# Patient Record
Sex: MALE | Race: WHITE | NOT HISPANIC OR LATINO | Employment: OTHER | URBAN - METROPOLITAN AREA
[De-identification: names, ages, dates, MRNs, and addresses within clinical notes are randomized per-mention and may not be internally consistent; named-entity substitution may affect disease eponyms.]

---

## 2018-01-24 ENCOUNTER — HOSPITAL ENCOUNTER (EMERGENCY)
Facility: HOSPITAL | Age: 63
Discharge: HOME | End: 2018-01-24
Attending: EMERGENCY MEDICINE
Payer: MEDICARE

## 2018-01-24 VITALS
HEART RATE: 100 BPM | DIASTOLIC BLOOD PRESSURE: 67 MMHG | OXYGEN SATURATION: 99 % | HEIGHT: 68 IN | WEIGHT: 170 LBS | SYSTOLIC BLOOD PRESSURE: 137 MMHG | BODY MASS INDEX: 25.76 KG/M2 | TEMPERATURE: 99 F | RESPIRATION RATE: 20 BRPM

## 2018-01-24 DIAGNOSIS — M54.50 ACUTE LOW BACK PAIN WITHOUT SCIATICA, UNSPECIFIED BACK PAIN LATERALITY: ICD-10-CM

## 2018-01-24 DIAGNOSIS — S00.03XA CONTUSION OF SCALP, INITIAL ENCOUNTER: ICD-10-CM
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
## 2018-01-24 DIAGNOSIS — S80.01XA CONTUSION OF KNEE, RIGHT: ICD-10-CM

## 2018-01-24 DIAGNOSIS — E86.0 DEHYDRATION: ICD-10-CM

## 2018-01-24 DIAGNOSIS — R53.1 WEAKNESS: ICD-10-CM

## 2018-01-24 LAB
ALBUMIN SERPL BCP-MCNC: 3.7 G/DL
ALP SERPL-CCNC: 67 U/L
ALT SERPL W/O P-5'-P-CCNC: 50 U/L
ANION GAP SERPL CALC-SCNC: 12 MMOL/L
AST SERPL-CCNC: 47 U/L
BACTERIA #/AREA URNS AUTO: ABNORMAL /HPF
BASOPHILS # BLD AUTO: 0.01 K/UL
BASOPHILS NFR BLD: 0.1 %
BILIRUB SERPL-MCNC: 0.3 MG/DL
BILIRUB UR QL STRIP: NEGATIVE
BUN SERPL-MCNC: 20 MG/DL
CALCIUM SERPL-MCNC: 9.3 MG/DL
CHLORIDE SERPL-SCNC: 99 MMOL/L
CK MB SERPL-MCNC: 1 NG/ML
CK MB SERPL-RTO: 1.1 %
CK SERPL-CCNC: 87 U/L
CK SERPL-CCNC: 87 U/L
CLARITY UR REFRACT.AUTO: ABNORMAL
CO2 SERPL-SCNC: 26 MMOL/L
COLOR UR AUTO: YELLOW
CREAT SERPL-MCNC: 1.4 MG/DL
DIFFERENTIAL METHOD: ABNORMAL
EOSINOPHIL # BLD AUTO: 0.1 K/UL
EOSINOPHIL NFR BLD: 0.9 %
ERYTHROCYTE [DISTWIDTH] IN BLOOD BY AUTOMATED COUNT: 14.3 %
EST. GFR  (AFRICAN AMERICAN): >60 ML/MIN/1.73 M^2
EST. GFR  (NON AFRICAN AMERICAN): 52.4 ML/MIN/1.73 M^2
GLUCOSE SERPL-MCNC: 130 MG/DL
GLUCOSE UR QL STRIP: NEGATIVE
HCT VFR BLD AUTO: 39.6 %
HGB BLD-MCNC: 13.2 G/DL
HGB UR QL STRIP: ABNORMAL
HYALINE CASTS UR QL AUTO: 0 /LPF
KETONES UR QL STRIP: ABNORMAL
LEUKOCYTE ESTERASE UR QL STRIP: NEGATIVE
LYMPHOCYTES # BLD AUTO: 1.3 K/UL
LYMPHOCYTES NFR BLD: 16.1 %
MAGNESIUM SERPL-MCNC: 1.7 MG/DL
MCH RBC QN AUTO: 32.4 PG
MCHC RBC AUTO-ENTMCNC: 33.3 G/DL
MCV RBC AUTO: 97 FL
MICROSCOPIC COMMENT: ABNORMAL
MONOCYTES # BLD AUTO: 1 K/UL
MONOCYTES NFR BLD: 12.4 %
NEUTROPHILS # BLD AUTO: 5.5 K/UL
NEUTROPHILS NFR BLD: 70.4 %
NITRITE UR QL STRIP: NEGATIVE
PH UR STRIP: 6 [PH] (ref 5–8)
PHOSPHATE SERPL-MCNC: 3.9 MG/DL
PLATELET # BLD AUTO: 203 K/UL
PMV BLD AUTO: 10.9 FL
POCT GLUCOSE: 118 MG/DL (ref 70–110)
POTASSIUM SERPL-SCNC: 4.3 MMOL/L
PROT SERPL-MCNC: 7.6 G/DL
PROT UR QL STRIP: ABNORMAL
RBC # BLD AUTO: 4.08 M/UL
RBC #/AREA URNS AUTO: 0 /HPF (ref 0–4)
SODIUM SERPL-SCNC: 137 MMOL/L
SP GR UR STRIP: >=1.03 (ref 1–1.03)
TROPONIN I SERPL DL<=0.01 NG/ML-MCNC: 0.01 NG/ML
TSH SERPL DL<=0.005 MIU/L-ACNC: 2.24 UIU/ML
URN SPEC COLLECT METH UR: ABNORMAL
UROBILINOGEN UR STRIP-ACNC: NEGATIVE EU/DL
WBC # BLD AUTO: 7.82 K/UL
WBC #/AREA URNS AUTO: 3 /HPF (ref 0–5)

## 2018-01-24 PROCEDURE — 81000 URINALYSIS NONAUTO W/SCOPE: CPT

## 2018-01-24 PROCEDURE — 93005 ELECTROCARDIOGRAM TRACING: CPT

## 2018-01-24 PROCEDURE — 96360 HYDRATION IV INFUSION INIT: CPT

## 2018-01-24 PROCEDURE — 84443 ASSAY THYROID STIM HORMONE: CPT

## 2018-01-24 PROCEDURE — 82962 GLUCOSE BLOOD TEST: CPT

## 2018-01-24 PROCEDURE — 80053 COMPREHEN METABOLIC PANEL: CPT

## 2018-01-24 PROCEDURE — 83735 ASSAY OF MAGNESIUM: CPT

## 2018-01-24 PROCEDURE — 84100 ASSAY OF PHOSPHORUS: CPT

## 2018-01-24 PROCEDURE — 82550 ASSAY OF CK (CPK): CPT

## 2018-01-24 PROCEDURE — 99284 EMERGENCY DEPT VISIT MOD MDM: CPT | Mod: 25

## 2018-01-24 PROCEDURE — 82553 CREATINE MB FRACTION: CPT

## 2018-01-24 PROCEDURE — 96361 HYDRATE IV INFUSION ADD-ON: CPT

## 2018-01-24 PROCEDURE — 25000003 PHARM REV CODE 250: Performed by: EMERGENCY MEDICINE

## 2018-01-24 PROCEDURE — 84484 ASSAY OF TROPONIN QUANT: CPT

## 2018-01-24 PROCEDURE — 93010 ELECTROCARDIOGRAM REPORT: CPT | Mod: ,,, | Performed by: INTERNAL MEDICINE

## 2018-01-24 PROCEDURE — 85025 COMPLETE CBC W/AUTO DIFF WBC: CPT

## 2018-01-24 RX ORDER — VENLAFAXINE HYDROCHLORIDE 75 MG/1
75 CAPSULE, EXTENDED RELEASE ORAL DAILY
COMMUNITY

## 2018-01-24 RX ORDER — GLIMEPIRIDE 4 MG/1
4 TABLET ORAL
COMMUNITY

## 2018-01-24 RX ORDER — ATORVASTATIN CALCIUM 10 MG/1
10 TABLET, FILM COATED ORAL DAILY
COMMUNITY
End: 2023-01-21

## 2018-01-24 RX ORDER — LOSARTAN POTASSIUM 50 MG/1
50 TABLET ORAL DAILY
COMMUNITY

## 2018-01-24 RX ORDER — CLOBETASOL PROPIONATE 0.46 MG/ML
SOLUTION TOPICAL 2 TIMES DAILY
COMMUNITY

## 2018-01-24 RX ORDER — METFORMIN HYDROCHLORIDE 1000 MG/1
1000 TABLET ORAL 2 TIMES DAILY WITH MEALS
COMMUNITY

## 2018-01-24 RX ORDER — DILTIAZEM HYDROCHLORIDE 180 MG/1
180 CAPSULE, COATED, EXTENDED RELEASE ORAL 2 TIMES DAILY
COMMUNITY

## 2018-01-24 RX ORDER — OSELTAMIVIR PHOSPHATE 75 MG/1
75 CAPSULE ORAL DAILY
Status: ON HOLD | COMMUNITY
End: 2018-02-03 | Stop reason: HOSPADM

## 2018-01-24 RX ORDER — AMOXICILLIN 500 MG
1 CAPSULE ORAL DAILY
COMMUNITY

## 2018-01-24 RX ORDER — INSULIN GLARGINE 100 [IU]/ML
30 INJECTION, SOLUTION SUBCUTANEOUS DAILY
COMMUNITY
End: 2023-01-21

## 2018-01-24 RX ORDER — GABAPENTIN 300 MG/1
300 CAPSULE ORAL 2 TIMES DAILY
COMMUNITY

## 2018-01-24 RX ORDER — SOLIFENACIN SUCCINATE 5 MG/1
5 TABLET, FILM COATED ORAL DAILY
COMMUNITY

## 2018-01-24 RX ORDER — LEVOTHYROXINE SODIUM 50 UG/1
50 TABLET ORAL DAILY
Status: ON HOLD | COMMUNITY
End: 2018-02-03 | Stop reason: HOSPADM

## 2018-01-24 RX ORDER — ACETAMINOPHEN 325 MG/1
650 TABLET ORAL
Status: COMPLETED | OUTPATIENT
Start: 2018-01-24 | End: 2018-01-24

## 2018-01-24 RX ORDER — CALCIPOTRIENE AND BETAMETHASONE DIPROPIONATE 50; .5 UG/G; MG/G
SUSPENSION TOPICAL DAILY
COMMUNITY

## 2018-01-24 RX ORDER — PHENYLEPHRINE HCL 10 MG
1000 TABLET ORAL DAILY
COMMUNITY

## 2018-01-24 RX ADMIN — ACETAMINOPHEN 650 MG: 325 TABLET, FILM COATED ORAL at 03:01

## 2018-01-24 RX ADMIN — SODIUM CHLORIDE 1000 ML: 0.9 INJECTION, SOLUTION INTRAVENOUS at 03:01

## 2018-01-24 RX ADMIN — SODIUM CHLORIDE 1000 ML: 0.9 INJECTION, SOLUTION INTRAVENOUS at 04:01

## 2018-01-24 RX ADMIN — SODIUM CHLORIDE 1000 ML: 0.9 INJECTION, SOLUTION INTRAVENOUS at 02:01

## 2018-01-24 NOTE — ED NOTES
Pt c/o generalized weakness and fatigue, and his legs giving out on his this morning approx 6:30 this morning. Reports being diagnosed with flu yesterday, and started taking tamiflu last night. Fell this morning, now complaining of bilateral knee pain, and lower back pain. R leg is weaker than left with ambulation.     Level of Consciousness: Patient is awake, alert, oriented to person, place, time, and situation.    Appearance: Pt lying in stretcher, no acute distress at this time. Clothing appropriately placed and clean. Hygiene is appropriate.   Skin: Skin is warm, dry, and intact. Skin turgor is normal/elastic. Mucous membranes dry. Skin color is normal for ethnicity. Abrasion to R side of scalp, and R knee. No bleeding at this time.   Musculoskeletal: Moderate ROM impairment with raising R leg. Able to bend both legs. No deformities noted. Generalized fatigue. R leg weakness. Full ROM with dorsal and plantar plexion. Equal strengths. Reports bilateral knee pain and lower back pain.  Gait unsteady.  Respiratory: Airway open and patent. Respirations equal and unlabored. Breath sounds clear to auscultation. Denies any SOB.   Cardiac: Regular rate and rhythm. No peripheral edema noted. Radial and pedal pulses present and normal. Capillary refill is within normal limits. Denies chest pain.    GI: Abdomen soft, non-tender to all quadrants with palpitation. Bowel sounds present and active in all quads. Abdomen symmetric with no distention noted. Denies any N/V/D.    Neurological: Symmetrical expressions noted to face. Equal bilateral . Normal sensation reported to all extremities. No obvious neurological deficits noted.   Psychosocial: Speech spontaneous, clear, and coherent. Appropriate to situation. Family at bedside. Pt is calm and cooperative.     Pt informed of plan of care, verbalizes understanding, and denies any other questions, complaints, or concerns at this time. Bed in locked in lowest position,  siderails up x2, call light within reach. Pt placed on cardiac monitor, blood pressure cuff and continuous pulse ox in place. Will continue to monitor.

## 2018-01-24 NOTE — ED PROVIDER NOTES
Encounter Date: 1/24/2018       History     Chief Complaint   Patient presents with    Fall     Pt reports being diagnosed with flu yesterday, started tamiflu last night, and woke up approx 6:30 am and legs gave out on him. Reports falling multiple times. Denies LOC. Abrasion to R side of head. Reports R leg weakness continues.      HPI Pt was dx c Flu yesterday and began taking Tamiflu last night. He reports that when he woke up this am to change the thermostat he became weak and dizzy and fell with resulting pain to right knee and low back and a headache/scalp contusion. Pt reports that he has difficulty ambulating secondary to pain from left hip/low back to right knee. Symptoms are worse with movement and improve with rest. Pt reports pain is moderate to severe and constant.       Review of patient's allergies indicates:  No Known Allergies  Past Medical History:   Diagnosis Date    Depression     Diabetes mellitus     Hyperlipidemia     Hypertension     Neuropathy     Psoriasis     Thyroid disease      No past surgical history on file.  No family history on file.  Social History   Substance Use Topics    Smoking status: Not on file    Smokeless tobacco: Not on file    Alcohol use Not on file     Review of Systems   Constitutional: Positive for fatigue.   HENT: Positive for rhinorrhea and sore throat.    Respiratory: Positive for cough.    Musculoskeletal:        Right knee pain, Low Back Pain, Hip Pain   Neurological: Positive for dizziness.   All other systems reviewed and are negative.      Physical Exam     Initial Vitals [01/24/18 1250]   BP Pulse Resp Temp SpO2   (!) 147/70 90 18 98.6 °F (37 °C) 97 %      MAP       95.67         Physical Exam    Nursing note and vitals reviewed.  Constitutional: He appears well-developed and well-nourished. He appears distressed.   HENT:   Head: Normocephalic.   Mouth/Throat: Uvula is midline. Mucous membranes are dry. Posterior oropharyngeal erythema present. No  oropharyngeal exudate.   Small abrasion right scalp   Eyes: Conjunctivae and EOM are normal. Pupils are equal, round, and reactive to light.   Neck: Normal range of motion. Neck supple.   Cardiovascular: Normal rate and regular rhythm.   Pulmonary/Chest: Breath sounds normal. No respiratory distress. He has no wheezes.   Abdominal: Soft. Bowel sounds are normal.   Musculoskeletal: He exhibits tenderness.        Right hip: He exhibits decreased range of motion. He exhibits no tenderness and no crepitus.        Left hip: He exhibits decreased range of motion. He exhibits no tenderness and no crepitus.        Right knee: He exhibits decreased range of motion. He exhibits no swelling, no effusion, no ecchymosis and no deformity. Tenderness found.        Cervical back: Normal.        Thoracic back: Normal.        Lumbar back: He exhibits tenderness. He exhibits no swelling, no edema and no deformity.   Neurological: He is alert and oriented to person, place, and time. He has normal strength.   Skin: Skin is warm and dry.   Psychiatric: He has a normal mood and affect. Thought content normal.         ED Course   Procedures  Labs Reviewed   POCT GLUCOSE - Abnormal; Notable for the following:        Result Value    POCT Glucose 118 (*)     All other components within normal limits     Results for orders placed or performed during the hospital encounter of 01/24/18   CBC auto differential   Result Value Ref Range    WBC 7.82 3.90 - 12.70 K/uL    RBC 4.08 (L) 4.60 - 6.20 M/uL    Hemoglobin 13.2 (L) 14.0 - 18.0 g/dL    Hematocrit 39.6 (L) 40.0 - 54.0 %    MCV 97 82 - 98 fL    MCH 32.4 (H) 27.0 - 31.0 pg    MCHC 33.3 32.0 - 36.0 g/dL    RDW 14.3 11.5 - 14.5 %    Platelets 203 150 - 350 K/uL    MPV 10.9 9.2 - 12.9 fL    Gran # 5.5 1.8 - 7.7 K/uL    Lymph # 1.3 1.0 - 4.8 K/uL    Mono # 1.0 0.3 - 1.0 K/uL    Eos # 0.1 0.0 - 0.5 K/uL    Baso # 0.01 0.00 - 0.20 K/uL    Gran% 70.4 38.0 - 73.0 %    Lymph% 16.1 (L) 18.0 - 48.0 %     Mono% 12.4 4.0 - 15.0 %    Eosinophil% 0.9 0.0 - 8.0 %    Basophil% 0.1 0.0 - 1.9 %    Differential Method Automated    Comprehensive metabolic panel   Result Value Ref Range    Sodium 137 136 - 145 mmol/L    Potassium 4.3 3.5 - 5.1 mmol/L    Chloride 99 95 - 110 mmol/L    CO2 26 23 - 29 mmol/L    Glucose 130 (H) 70 - 110 mg/dL    BUN, Bld 20 8 - 23 mg/dL    Creatinine 1.4 0.5 - 1.4 mg/dL    Calcium 9.3 8.7 - 10.5 mg/dL    Total Protein 7.6 6.0 - 8.4 g/dL    Albumin 3.7 3.5 - 5.2 g/dL    Total Bilirubin 0.3 0.1 - 1.0 mg/dL    Alkaline Phosphatase 67 55 - 135 U/L    AST 47 (H) 10 - 40 U/L    ALT 50 (H) 10 - 44 U/L    Anion Gap 12 8 - 16 mmol/L    eGFR if African American >60.0 >60 mL/min/1.73 m^2    eGFR if non African American 52.4 (A) >60 mL/min/1.73 m^2   CK   Result Value Ref Range    CPK 87 20 - 200 U/L   CK-MB   Result Value Ref Range    CPK 87 20 - 200 U/L    CPK MB 1.0 0.1 - 6.5 ng/mL    MB% 1.1 0.0 - 5.0 %   Troponin I   Result Value Ref Range    Troponin I 0.009 0.000 - 0.026 ng/mL   Urinalysis   Result Value Ref Range    Specimen UA Urine, Clean Catch     Color, UA Yellow Yellow, Straw, [place]    Appearance, UA Hazy (A) Clear    pH, UA 6.0 5.0 - 8.0    Specific Gravity, UA >=1.030 (A) 1.005 - 1.030    Protein, UA 1+ (A) Negative    Glucose, UA Negative Negative    Ketones, UA 1+ (A) Negative    Bilirubin (UA) Negative Negative    Occult Blood UA Trace (A) Negative    Nitrite, UA Negative Negative    Urobilinogen, UA Negative <2.0 EU/dL    Leukocytes, UA Negative Negative   TSH   Result Value Ref Range    TSH 2.237 0.400 - 4.000 uIU/mL   Magnesium   Result Value Ref Range    Magnesium 1.7 1.6 - 2.6 mg/dL   Phosphorus   Result Value Ref Range    Phosphorus 3.9 2.7 - 4.5 mg/dL   Urinalysis Microscopic   Result Value Ref Range    RBC, UA 0 0 - 4 /hpf    WBC, UA 3 0 - 5 /hpf    Bacteria, UA Few (A) None-Occ /hpf    Hyaline Casts, UA 0 0-1/lpf /lpf    Microscopic Comment SEE COMMENT    POCT glucose   Result Value  Ref Range    POCT Glucose 118 (H) 70 - 110 mg/dL     Imaging Results          CT Pelvis Without Contrast (Final result)  Result time 01/24/18 14:51:33    Final result by OLIVER Grant Sr., MD (01/24/18 14:51:33)                 Impression:      1. No fracture or dislocation. The joint space of both hips is normal in appearance.   2. There is grade 1 retrolisthesis of L5 on S1.  3. There are mild degenerative changes between L5 and S1.   4. There are mild osteoarthritic changes in the sacroiliac joints bilaterally.        All CT scans at this facility use dose modulation, iterative reconstruction, and/or weight base dosing when appropriate to reduce radiation dose when appropriate to reduce radiation dose to as low as reasonably achievable.      Electronically signed by: OLIVER GRANT MD  Date:     01/24/18  Time:    14:51              Narrative:    CT of pelvis without IV Contrast    History: Hip pain status post fall    Technique: Standard pelvic CT protocol without IV contrast was performed.    Finding: Comparison was made to a CT examination of the lumbar spine performed on 1/24/2018. There is no fracture. There is no dislocation. The joint space of both hips is normal in appearance. There are mild degenerative changes between L5 and S1. There is grade 1 retrolisthesis of L5 on S1.    The appendix and the rest of the visualized portion of the gastrointestinal system are normal in appearance. There is no free fluid within the abdomen or pelvis. There is no pneumoperitoneum. There is a moderate amount of atherosclerosis. The prostate is absent. There are surgical clips in the pelvis. There are mild osteoarthritic changes in the sacroiliac joints bilaterally.                             CT Lumbar Spine Without Contrast (Final result)  Result time 01/24/18 14:41:24    Final result by Jose Pisano MD (01/24/18 14:41:24)                 Impression:       Degenerative changes greatest at L5/S1    All CT scans at  this facility use dose modulation, iterative reconstruction, and/or weight base dosing when appropriate to reduce radiation dose to as low as reasonably achievable.      Electronically signed by: JOSE TEJADA MD  Date:     01/24/18  Time:    14:41              Narrative:    Lumbar spine CT without contrast:    History:  Low back pain    Results:  The lumbar vertebral bodies demonstrate adequate alignment.The vertebral body heights are well-maintained.No fractures are identified.No secondary evidence of fracture (such as jacob-osseous hematoma) is identified.Mild osteopenia.    There is mild degenerative disc disease with the greatest level of involvement being L5/S1 with posterior disc osteophyte complex and disc space narrowing producing mild canal stenosis and mild bilateral neuroforaminal narrowing. Small posterior bulge is seen at L4/5.    Moderate to severe atherosclerotic disease within the aorta and iliac vessels. Nonspecific perinephric stranding. The remaining visualized paravertebral and intraabdominal structures demonstrate no pathology.                             CT Head Without Contrast (Final result)  Result time 01/24/18 14:32:01    Final result by oJse Tejada MD (01/24/18 14:32:01)                 Impression:       No acute abnormality identified.    All CT scans at this facility use dose modulation, iterative reconstruction and/or weight based dosing when appropriate to reduce radiation dose to as low as reasonably achievable.      Electronically signed by: JOSE ETJADA MD  Date:     01/24/18  Time:    14:32              Narrative:    Indication: Trauma or a.    Axial CT images of the head were obtained without  contrast.    Comparison:  None    Findings: Remote left-sided lacunar infarct.    Ventricles, sulci, and cisterns are symmetric without evidence of mass effect.  Brain volume and white matter are normal for age.  No intra or extraaxial masses, hemorrhages, abnormal fluid collections or  abnormal calcifications. The cranium and extracranial structures are unremarkable.  Mild ethmoid mucosal thickening. Visualized sinuses and mastoid air cells are clear.                             X-Ray Chest 1 View (Final result)  Result time 01/24/18 14:07:57    Final result by Jose Tejada MD (01/24/18 14:07:57)                 Impression:       No acute process seen. Weakness      Electronically signed by: JOSE TEJADA MD  Date:     01/24/18  Time:    14:07              Narrative:    Clinical Data:Weakness    Comparison:  none    Findings:  Single view of the chest.      Cardiac silhouette is normal.  The lungs demonstrate no evidence of active disease.  No evidence of pleural effusion or pneumothorax.  Bones appear intact.                             X-Ray Knee Complete 4 Or More Views Right (Final result)  Result time 01/24/18 14:07:39    Final result by Jose Tejada MD (01/24/18 14:07:39)                 Impression:      No acute fracture or dislocation.        Electronically signed by: JOSE TEJADA MD  Date:     01/24/18  Time:    14:07              Narrative:    History:  Pain    Comparison:  None    Results:  4 views of the right knee were obtained.    No evidence of acute fracture or dislocation.  Bony mineralization is normal.  Soft tissues are unremarkable.  Mild tricompartment degenerative changes. No significant joint effusion. Vascular calcifications are noted.                              EKG Readings: (Independently Interpreted)   Initial Reading: No STEMI. Rhythm: Normal Sinus Rhythm. Heart Rate: 93. Ectopy: No Ectopy. ST Segments: Normal ST Segments. T Waves: Normal. Axis: Normal. Clinical Impression: Normal Sinus Rhythm     4:51 PM - Counseling: Spoke with the patient and discussed todays findings, in addition to providing specific details for the plan of care and counseling regarding the diagnosis and prognosis. Questions are answered at this time. GCS 15 Pt feels improved after IVFs,  N/V intact. Patient presents with upper respiratory and flulike symptoms. Based on my assessment in the ED, I do not suspect any respiratory, airway, pulmonary, cardiovascular (including myocarditis), metabolic, CNS, medical, or surgical emergency medical condition. I have discussed with the patient and/or caregiver signs and symptoms for secondary bacterial infections, such as pneumonia. I believe that the patient's symptoms are most consistent with a viral illness, possibly influenza. Patient is safe for discharge home with conservative therapy. Trauma precautions were discussed with patient and/or family/caretaker; I do not specifically detect any abdominal, thoracic, CNS, orthopedic, or other emergent or life threatening condition and that patient is safe to be discharged.  It was also discussed that despite an unrevealing examination and negative radiographic examination for serious or life threatening injury, these conditions may still exist.  As such, patient should return to ED immediately should they experience, severe or worsening pain, shortness of breath, abdominal pain, headache, vomiting, or any other concern.  It was also discussed that not infrequently, injuries may not be diagnosed during the initial ED visit (such as fractures) and that if the patient discovers a new area of concern, a new area of injury that was not evaluated in the ED, they should return for evaluation as they may have an injury that requires treatment.                             ED Course      Clinical Impression:   The primary encounter diagnosis was Fall. Diagnoses of Contusion of knee, right, Weakness, Dehydration, Acute low back pain without sciatica, unspecified back pain laterality, and Contusion of scalp, initial encounter were also pertinent to this visit.    Disposition:   Disposition: Discharged  Condition: Stable                        Marcelino Swann MD  01/24/18 [id number]

## 2018-02-01 ENCOUNTER — HOSPITAL ENCOUNTER (INPATIENT)
Facility: HOSPITAL | Age: 63
LOS: 2 days | Discharge: HOME | DRG: 871 | End: 2018-02-03
Attending: EMERGENCY MEDICINE | Admitting: HOSPITALIST
Payer: MEDICARE

## 2018-02-01 DIAGNOSIS — E87.20 LACTIC ACIDOSIS: ICD-10-CM

## 2018-02-01 DIAGNOSIS — R65.20 SEVERE SEPSIS: Primary | ICD-10-CM

## 2018-02-01 DIAGNOSIS — A41.9 SEVERE SEPSIS: Primary | ICD-10-CM

## 2018-02-01 DIAGNOSIS — N30.01 ACUTE CYSTITIS WITH HEMATURIA: ICD-10-CM

## 2018-02-01 DIAGNOSIS — R53.1 WEAKNESS: ICD-10-CM

## 2018-02-01 LAB
ALBUMIN SERPL BCP-MCNC: 3.4 G/DL
ALP SERPL-CCNC: 77 U/L
ALT SERPL W/O P-5'-P-CCNC: 21 U/L
ANION GAP SERPL CALC-SCNC: 16 MMOL/L
APTT BLDCRRT: 28.4 SEC
AST SERPL-CCNC: 14 U/L
BACTERIA #/AREA URNS AUTO: ABNORMAL /HPF
BASOPHILS # BLD AUTO: 0.02 K/UL
BASOPHILS NFR BLD: 0.2 %
BILIRUB SERPL-MCNC: 0.5 MG/DL
BILIRUB UR QL STRIP: ABNORMAL
BNP SERPL-MCNC: <10 PG/ML
BUN SERPL-MCNC: 30 MG/DL
CALCIUM SERPL-MCNC: 9.7 MG/DL
CHLORIDE SERPL-SCNC: 94 MMOL/L
CLARITY UR REFRACT.AUTO: ABNORMAL
CO2 SERPL-SCNC: 24 MMOL/L
COLOR UR AUTO: ABNORMAL
CREAT SERPL-MCNC: 2.2 MG/DL
DIFFERENTIAL METHOD: ABNORMAL
EOSINOPHIL # BLD AUTO: 0.1 K/UL
EOSINOPHIL NFR BLD: 0.9 %
ERYTHROCYTE [DISTWIDTH] IN BLOOD BY AUTOMATED COUNT: 13.5 %
EST. GFR  (AFRICAN AMERICAN): 35 ML/MIN/1.73 M^2
EST. GFR  (NON AFRICAN AMERICAN): 30.3 ML/MIN/1.73 M^2
GLUCOSE SERPL-MCNC: 284 MG/DL
GLUCOSE UR QL STRIP: NEGATIVE
HCT VFR BLD AUTO: 39.1 %
HGB BLD-MCNC: 13 G/DL
HGB UR QL STRIP: NEGATIVE
HYALINE CASTS UR QL AUTO: 2 /LPF
INR PPP: 1
KETONES UR QL STRIP: ABNORMAL
LACTATE SERPL-SCNC: 1.7 MMOL/L
LACTATE SERPL-SCNC: 2.7 MMOL/L
LEUKOCYTE ESTERASE UR QL STRIP: ABNORMAL
LIPASE SERPL-CCNC: 31 U/L
LYMPHOCYTES # BLD AUTO: 2.2 K/UL
LYMPHOCYTES NFR BLD: 18.5 %
MAGNESIUM SERPL-MCNC: 1.9 MG/DL
MCH RBC QN AUTO: 31.8 PG
MCHC RBC AUTO-ENTMCNC: 33.2 G/DL
MCV RBC AUTO: 96 FL
MICROSCOPIC COMMENT: ABNORMAL
MONOCYTES # BLD AUTO: 1.3 K/UL
MONOCYTES NFR BLD: 10.7 %
NEUTROPHILS # BLD AUTO: 8.4 K/UL
NEUTROPHILS NFR BLD: 69.2 %
NITRITE UR QL STRIP: NEGATIVE
PH UR STRIP: 6 [PH] (ref 5–8)
PHOSPHATE SERPL-MCNC: 3.8 MG/DL
PLATELET # BLD AUTO: 342 K/UL
PMV BLD AUTO: 10.8 FL
POTASSIUM SERPL-SCNC: 4.1 MMOL/L
PROT SERPL-MCNC: 8 G/DL
PROT UR QL STRIP: ABNORMAL
PROTHROMBIN TIME: 10.7 SEC
RBC # BLD AUTO: 4.09 M/UL
RBC #/AREA URNS AUTO: 1 /HPF (ref 0–4)
SODIUM SERPL-SCNC: 134 MMOL/L
SP GR UR STRIP: >=1.03 (ref 1–1.03)
SQUAMOUS #/AREA URNS AUTO: 10 /HPF
T4 FREE SERPL-MCNC: 0.99 NG/DL
TROPONIN I SERPL DL<=0.01 NG/ML-MCNC: <0.006 NG/ML
TSH SERPL DL<=0.005 MIU/L-ACNC: 4.19 UIU/ML
URN SPEC COLLECT METH UR: ABNORMAL
UROBILINOGEN UR STRIP-ACNC: NEGATIVE EU/DL
WBC # BLD AUTO: 12.09 K/UL
WBC #/AREA URNS AUTO: 53 /HPF (ref 0–5)

## 2018-02-01 PROCEDURE — 63600175 PHARM REV CODE 636 W HCPCS: Performed by: EMERGENCY MEDICINE

## 2018-02-01 PROCEDURE — 83690 ASSAY OF LIPASE: CPT

## 2018-02-01 PROCEDURE — 84484 ASSAY OF TROPONIN QUANT: CPT

## 2018-02-01 PROCEDURE — 84100 ASSAY OF PHOSPHORUS: CPT

## 2018-02-01 PROCEDURE — 84443 ASSAY THYROID STIM HORMONE: CPT

## 2018-02-01 PROCEDURE — 87077 CULTURE AEROBIC IDENTIFY: CPT

## 2018-02-01 PROCEDURE — 93010 ELECTROCARDIOGRAM REPORT: CPT | Mod: ,,, | Performed by: NUCLEAR MEDICINE

## 2018-02-01 PROCEDURE — 21400001 HC TELEMETRY ROOM

## 2018-02-01 PROCEDURE — 83605 ASSAY OF LACTIC ACID: CPT

## 2018-02-01 PROCEDURE — 87186 SC STD MICRODIL/AGAR DIL: CPT

## 2018-02-01 PROCEDURE — 80053 COMPREHEN METABOLIC PANEL: CPT

## 2018-02-01 PROCEDURE — 85730 THROMBOPLASTIN TIME PARTIAL: CPT

## 2018-02-01 PROCEDURE — 99285 EMERGENCY DEPT VISIT HI MDM: CPT | Mod: 25

## 2018-02-01 PROCEDURE — 96361 HYDRATE IV INFUSION ADD-ON: CPT

## 2018-02-01 PROCEDURE — 85610 PROTHROMBIN TIME: CPT

## 2018-02-01 PROCEDURE — 84439 ASSAY OF FREE THYROXINE: CPT

## 2018-02-01 PROCEDURE — 83880 ASSAY OF NATRIURETIC PEPTIDE: CPT

## 2018-02-01 PROCEDURE — 87040 BLOOD CULTURE FOR BACTERIA: CPT | Mod: 59

## 2018-02-01 PROCEDURE — 85025 COMPLETE CBC W/AUTO DIFF WBC: CPT

## 2018-02-01 PROCEDURE — 93005 ELECTROCARDIOGRAM TRACING: CPT

## 2018-02-01 PROCEDURE — 83735 ASSAY OF MAGNESIUM: CPT

## 2018-02-01 PROCEDURE — 25000003 PHARM REV CODE 250: Performed by: EMERGENCY MEDICINE

## 2018-02-01 PROCEDURE — 99900035 HC TECH TIME PER 15 MIN (STAT)

## 2018-02-01 PROCEDURE — 81000 URINALYSIS NONAUTO W/SCOPE: CPT

## 2018-02-01 PROCEDURE — 96374 THER/PROPH/DIAG INJ IV PUSH: CPT

## 2018-02-01 PROCEDURE — 87086 URINE CULTURE/COLONY COUNT: CPT

## 2018-02-01 PROCEDURE — 87088 URINE BACTERIA CULTURE: CPT

## 2018-02-01 RX ORDER — SODIUM CHLORIDE 9 MG/ML
INJECTION, SOLUTION INTRAVENOUS CONTINUOUS
Status: DISCONTINUED | OUTPATIENT
Start: 2018-02-01 | End: 2018-02-03

## 2018-02-01 RX ORDER — ACETAMINOPHEN 325 MG/1
650 TABLET ORAL EVERY 8 HOURS PRN
Status: DISCONTINUED | OUTPATIENT
Start: 2018-02-01 | End: 2018-02-02

## 2018-02-01 RX ORDER — CEFTRIAXONE 1 G/50ML
1 INJECTION, SOLUTION INTRAVENOUS
Status: DISCONTINUED | OUTPATIENT
Start: 2018-02-01 | End: 2018-02-01

## 2018-02-01 RX ORDER — ONDANSETRON 2 MG/ML
4 INJECTION INTRAMUSCULAR; INTRAVENOUS EVERY 12 HOURS PRN
Status: DISCONTINUED | OUTPATIENT
Start: 2018-02-01 | End: 2018-02-02

## 2018-02-01 RX ORDER — CEFTRIAXONE 1 G/1
1 INJECTION, POWDER, FOR SOLUTION INTRAMUSCULAR; INTRAVENOUS
Status: DISCONTINUED | OUTPATIENT
Start: 2018-02-01 | End: 2018-02-01

## 2018-02-01 RX ADMIN — SODIUM CHLORIDE 2244 ML: 0.9 INJECTION, SOLUTION INTRAVENOUS at 07:02

## 2018-02-01 RX ADMIN — CEFTRIAXONE SODIUM 1 G: 1 INJECTION, POWDER, FOR SOLUTION INTRAMUSCULAR; INTRAVENOUS at 08:02

## 2018-02-01 RX ADMIN — SODIUM CHLORIDE: 0.9 INJECTION, SOLUTION INTRAVENOUS at 09:02

## 2018-02-02 PROBLEM — N17.9 AKI (ACUTE KIDNEY INJURY): Status: ACTIVE | Noted: 2018-02-02

## 2018-02-02 PROBLEM — N30.01 ACUTE CYSTITIS WITH HEMATURIA: Status: ACTIVE | Noted: 2018-02-02

## 2018-02-02 PROBLEM — R65.20 SEVERE SEPSIS: Status: ACTIVE | Noted: 2018-02-01

## 2018-02-02 PROBLEM — A41.9 SEVERE SEPSIS: Status: ACTIVE | Noted: 2018-02-01

## 2018-02-02 LAB
ALBUMIN SERPL BCP-MCNC: 3 G/DL
ALP SERPL-CCNC: 69 U/L
ALT SERPL W/O P-5'-P-CCNC: 17 U/L
ANION GAP SERPL CALC-SCNC: 9 MMOL/L
AST SERPL-CCNC: 13 U/L
BASOPHILS # BLD AUTO: 0.02 K/UL
BASOPHILS NFR BLD: 0.2 %
BILIRUB SERPL-MCNC: 0.2 MG/DL
BUN SERPL-MCNC: 24 MG/DL
CALCIUM SERPL-MCNC: 9 MG/DL
CHLORIDE SERPL-SCNC: 103 MMOL/L
CO2 SERPL-SCNC: 29 MMOL/L
CREAT SERPL-MCNC: 1.4 MG/DL
DIFFERENTIAL METHOD: ABNORMAL
EOSINOPHIL # BLD AUTO: 0.2 K/UL
EOSINOPHIL NFR BLD: 2.1 %
ERYTHROCYTE [DISTWIDTH] IN BLOOD BY AUTOMATED COUNT: 13.6 %
EST. GFR  (AFRICAN AMERICAN): >60 ML/MIN/1.73 M^2
EST. GFR  (NON AFRICAN AMERICAN): 52 ML/MIN/1.73 M^2
ESTIMATED AVG GLUCOSE: 160 MG/DL
GLUCOSE SERPL-MCNC: 262 MG/DL
HBA1C MFR BLD HPLC: 7.2 %
HCT VFR BLD AUTO: 32.6 %
HGB BLD-MCNC: 11.8 G/DL
LACTATE SERPL-SCNC: 1.7 MMOL/L
LYMPHOCYTES # BLD AUTO: 2.1 K/UL
LYMPHOCYTES NFR BLD: 25.2 %
MAGNESIUM SERPL-MCNC: 1.8 MG/DL
MCH RBC QN AUTO: 32.3 PG
MCHC RBC AUTO-ENTMCNC: 36.2 G/DL
MCV RBC AUTO: 89 FL
MONOCYTES # BLD AUTO: 0.9 K/UL
MONOCYTES NFR BLD: 10.6 %
NEUTROPHILS # BLD AUTO: 5.1 K/UL
NEUTROPHILS NFR BLD: 61.9 %
PHOSPHATE SERPL-MCNC: 2.3 MG/DL
PLATELET # BLD AUTO: 294 K/UL
PMV BLD AUTO: 9.9 FL
POCT GLUCOSE: 224 MG/DL (ref 70–110)
POCT GLUCOSE: 233 MG/DL (ref 70–110)
POCT GLUCOSE: 244 MG/DL (ref 70–110)
POCT GLUCOSE: 249 MG/DL (ref 70–110)
POTASSIUM SERPL-SCNC: 4.6 MMOL/L
PROT SERPL-MCNC: 7 G/DL
RBC # BLD AUTO: 3.65 M/UL
SODIUM SERPL-SCNC: 141 MMOL/L
WBC # BLD AUTO: 8.17 K/UL

## 2018-02-02 PROCEDURE — 36415 COLL VENOUS BLD VENIPUNCTURE: CPT

## 2018-02-02 PROCEDURE — 83605 ASSAY OF LACTIC ACID: CPT

## 2018-02-02 PROCEDURE — 80053 COMPREHEN METABOLIC PANEL: CPT

## 2018-02-02 PROCEDURE — 85025 COMPLETE CBC W/AUTO DIFF WBC: CPT

## 2018-02-02 PROCEDURE — 25000003 PHARM REV CODE 250: Performed by: NURSE PRACTITIONER

## 2018-02-02 PROCEDURE — 83036 HEMOGLOBIN GLYCOSYLATED A1C: CPT

## 2018-02-02 PROCEDURE — 25000003 PHARM REV CODE 250: Performed by: EMERGENCY MEDICINE

## 2018-02-02 PROCEDURE — 96372 THER/PROPH/DIAG INJ SC/IM: CPT

## 2018-02-02 PROCEDURE — 25000003 PHARM REV CODE 250: Performed by: HOSPITALIST

## 2018-02-02 PROCEDURE — 63600175 PHARM REV CODE 636 W HCPCS: Performed by: NURSE PRACTITIONER

## 2018-02-02 PROCEDURE — 63600175 PHARM REV CODE 636 W HCPCS: Performed by: HOSPITALIST

## 2018-02-02 PROCEDURE — 83735 ASSAY OF MAGNESIUM: CPT

## 2018-02-02 PROCEDURE — 63600175 PHARM REV CODE 636 W HCPCS: Performed by: FAMILY MEDICINE

## 2018-02-02 PROCEDURE — 84100 ASSAY OF PHOSPHORUS: CPT

## 2018-02-02 PROCEDURE — 21400001 HC TELEMETRY ROOM

## 2018-02-02 RX ORDER — LOSARTAN POTASSIUM 50 MG/1
50 TABLET ORAL DAILY
Status: DISCONTINUED | OUTPATIENT
Start: 2018-02-02 | End: 2018-02-03 | Stop reason: HOSPADM

## 2018-02-02 RX ORDER — ENOXAPARIN SODIUM 100 MG/ML
40 INJECTION SUBCUTANEOUS EVERY 24 HOURS
Status: DISCONTINUED | OUTPATIENT
Start: 2018-02-02 | End: 2018-02-03 | Stop reason: HOSPADM

## 2018-02-02 RX ORDER — DILTIAZEM HYDROCHLORIDE 180 MG/1
180 CAPSULE, COATED, EXTENDED RELEASE ORAL DAILY
Status: DISCONTINUED | OUTPATIENT
Start: 2018-02-02 | End: 2018-02-03 | Stop reason: HOSPADM

## 2018-02-02 RX ORDER — GABAPENTIN 300 MG/1
300 CAPSULE ORAL 2 TIMES DAILY
Status: DISCONTINUED | OUTPATIENT
Start: 2018-02-02 | End: 2018-02-03 | Stop reason: HOSPADM

## 2018-02-02 RX ORDER — PROMETHAZINE HYDROCHLORIDE AND CODEINE PHOSPHATE 6.25; 1 MG/5ML; MG/5ML
5 SOLUTION ORAL EVERY 6 HOURS PRN
Status: DISCONTINUED | OUTPATIENT
Start: 2018-02-02 | End: 2018-02-03 | Stop reason: HOSPADM

## 2018-02-02 RX ORDER — INSULIN ASPART 100 [IU]/ML
0-5 INJECTION, SOLUTION INTRAVENOUS; SUBCUTANEOUS
Status: DISCONTINUED | OUTPATIENT
Start: 2018-02-02 | End: 2018-02-03 | Stop reason: HOSPADM

## 2018-02-02 RX ORDER — LEVOTHYROXINE SODIUM 50 UG/1
50 TABLET ORAL
Status: DISCONTINUED | OUTPATIENT
Start: 2018-02-02 | End: 2018-02-03

## 2018-02-02 RX ORDER — ATORVASTATIN CALCIUM 10 MG/1
10 TABLET, FILM COATED ORAL DAILY
Status: DISCONTINUED | OUTPATIENT
Start: 2018-02-02 | End: 2018-02-03 | Stop reason: HOSPADM

## 2018-02-02 RX ORDER — ASPIRIN 81 MG/1
81 TABLET ORAL DAILY
Status: DISCONTINUED | OUTPATIENT
Start: 2018-02-02 | End: 2018-02-03 | Stop reason: HOSPADM

## 2018-02-02 RX ORDER — PANTOPRAZOLE SODIUM 40 MG/1
40 TABLET, DELAYED RELEASE ORAL DAILY
Status: DISCONTINUED | OUTPATIENT
Start: 2018-02-02 | End: 2018-02-03 | Stop reason: HOSPADM

## 2018-02-02 RX ORDER — GLUCAGON 1 MG
1 KIT INJECTION
Status: DISCONTINUED | OUTPATIENT
Start: 2018-02-02 | End: 2018-02-03 | Stop reason: HOSPADM

## 2018-02-02 RX ORDER — VENLAFAXINE HYDROCHLORIDE 75 MG/1
75 CAPSULE, EXTENDED RELEASE ORAL DAILY
Status: DISCONTINUED | OUTPATIENT
Start: 2018-02-02 | End: 2018-02-03 | Stop reason: HOSPADM

## 2018-02-02 RX ORDER — IBUPROFEN 200 MG
24 TABLET ORAL
Status: DISCONTINUED | OUTPATIENT
Start: 2018-02-02 | End: 2018-02-03 | Stop reason: HOSPADM

## 2018-02-02 RX ORDER — ENOXAPARIN SODIUM 100 MG/ML
30 INJECTION SUBCUTANEOUS EVERY 24 HOURS
Status: DISCONTINUED | OUTPATIENT
Start: 2018-02-02 | End: 2018-02-02

## 2018-02-02 RX ORDER — IBUPROFEN 200 MG
16 TABLET ORAL
Status: DISCONTINUED | OUTPATIENT
Start: 2018-02-02 | End: 2018-02-03 | Stop reason: HOSPADM

## 2018-02-02 RX ORDER — ONDANSETRON 2 MG/ML
4 INJECTION INTRAMUSCULAR; INTRAVENOUS EVERY 6 HOURS PRN
Status: DISCONTINUED | OUTPATIENT
Start: 2018-02-02 | End: 2018-02-03 | Stop reason: HOSPADM

## 2018-02-02 RX ORDER — ACETAMINOPHEN 325 MG/1
650 TABLET ORAL EVERY 8 HOURS PRN
Status: DISCONTINUED | OUTPATIENT
Start: 2018-02-02 | End: 2018-02-03 | Stop reason: HOSPADM

## 2018-02-02 RX ORDER — HYDRALAZINE HYDROCHLORIDE 20 MG/ML
10 INJECTION INTRAMUSCULAR; INTRAVENOUS EVERY 6 HOURS PRN
Status: DISCONTINUED | OUTPATIENT
Start: 2018-02-02 | End: 2018-02-03 | Stop reason: HOSPADM

## 2018-02-02 RX ORDER — PROMETHAZINE HYDROCHLORIDE AND DEXTROMETHORPHAN HYDROBROMIDE 6.25; 15 MG/5ML; MG/5ML
5 SYRUP ORAL EVERY 6 HOURS PRN
COMMUNITY
Start: 2018-01-23

## 2018-02-02 RX ORDER — SOLIFENACIN SUCCINATE 5 MG/1
5 TABLET, FILM COATED ORAL DAILY
Status: DISCONTINUED | OUTPATIENT
Start: 2018-02-02 | End: 2018-02-03 | Stop reason: HOSPADM

## 2018-02-02 RX ORDER — ASPIRIN 81 MG/1
81 TABLET ORAL DAILY
COMMUNITY
End: 2023-01-21

## 2018-02-02 RX ADMIN — INSULIN DETEMIR 30 UNITS: 100 INJECTION, SOLUTION SUBCUTANEOUS at 09:02

## 2018-02-02 RX ADMIN — INSULIN ASPART 2 UNITS: 100 INJECTION, SOLUTION INTRAVENOUS; SUBCUTANEOUS at 05:02

## 2018-02-02 RX ADMIN — VENLAFAXINE HYDROCHLORIDE 75 MG: 75 CAPSULE, EXTENDED RELEASE ORAL at 09:02

## 2018-02-02 RX ADMIN — GUAIFENESIN AND DEXTROMETHORPHAN HYDROBROMIDE 1 TABLET: 600; 30 TABLET, EXTENDED RELEASE ORAL at 09:02

## 2018-02-02 RX ADMIN — INSULIN ASPART 1 UNITS: 100 INJECTION, SOLUTION INTRAVENOUS; SUBCUTANEOUS at 09:02

## 2018-02-02 RX ADMIN — PROMETHAZINE HYDROCHLORIDE AND CODEINE PHOSPHATE 5 ML: 10; 6.25 SOLUTION ORAL at 10:02

## 2018-02-02 RX ADMIN — ONDANSETRON 4 MG: 2 INJECTION INTRAMUSCULAR; INTRAVENOUS at 10:02

## 2018-02-02 RX ADMIN — ASPIRIN 81 MG: 81 TABLET, COATED ORAL at 04:02

## 2018-02-02 RX ADMIN — LOSARTAN POTASSIUM 50 MG: 50 TABLET, FILM COATED ORAL at 04:02

## 2018-02-02 RX ADMIN — PANTOPRAZOLE SODIUM 40 MG: 40 TABLET, DELAYED RELEASE ORAL at 09:02

## 2018-02-02 RX ADMIN — SODIUM CHLORIDE: 0.9 INJECTION, SOLUTION INTRAVENOUS at 04:02

## 2018-02-02 RX ADMIN — ATORVASTATIN CALCIUM 10 MG: 10 TABLET, FILM COATED ORAL at 09:02

## 2018-02-02 RX ADMIN — CEFTRIAXONE 1 G: 1 INJECTION, SOLUTION INTRAVENOUS at 07:02

## 2018-02-02 RX ADMIN — INSULIN ASPART 2 UNITS: 100 INJECTION, SOLUTION INTRAVENOUS; SUBCUTANEOUS at 06:02

## 2018-02-02 RX ADMIN — LEVOTHYROXINE SODIUM 50 MCG: 50 TABLET ORAL at 05:02

## 2018-02-02 RX ADMIN — PROMETHAZINE HYDROCHLORIDE AND CODEINE PHOSPHATE 5 ML: 10; 6.25 SOLUTION ORAL at 01:02

## 2018-02-02 RX ADMIN — GABAPENTIN 300 MG: 300 CAPSULE ORAL at 09:02

## 2018-02-02 RX ADMIN — SOLIFENACIN SUCCINATE 5 MG: 5 TABLET, FILM COATED ORAL at 09:02

## 2018-02-02 RX ADMIN — ENOXAPARIN SODIUM 40 MG: 100 INJECTION SUBCUTANEOUS at 04:02

## 2018-02-02 RX ADMIN — DILTIAZEM HYDROCHLORIDE 180 MG: 180 CAPSULE, COATED, EXTENDED RELEASE ORAL at 03:02

## 2018-02-02 RX ADMIN — INSULIN ASPART 2 UNITS: 100 INJECTION, SOLUTION INTRAVENOUS; SUBCUTANEOUS at 12:02

## 2018-02-02 RX ADMIN — ACETAMINOPHEN 650 MG: 325 TABLET ORAL at 04:02

## 2018-02-02 NOTE — HOSPITAL COURSE
Mr. Ordaz is a 62yo male with a PMHx of HTN, hypothyroidism, and DM II, admitted for severe sepsis from UTI. Sepsis protocol including IV hydration initiated in the ED. Most likely secondary to UTI. Patient being treated with IV hydration and IV Rocephin. Patient c/o left knee pain and swelling. Left knee xray showed Questionable avulsion fracture medial femoral condyle in the region of the medial collateral ligament insertion. Knee immobilizer placed. Blood cx show NGTD. Urine cx growing a small GNR. Pt feels much better now, no new fever chills, no urinary Sx, no fever or chills, eating drinking well, walking around in the [place] well. Blood Cx remain NGTD. He wishes to be discharged home now. He was seen and examined today and deemed stable for discharge.

## 2018-02-02 NOTE — ASSESSMENT & PLAN NOTE
- Likely due to IVVD.  - IV hydration.  - Strict I&O's.  - Hold home ARB + metformin.  - Improved

## 2018-02-02 NOTE — ASSESSMENT & PLAN NOTE
- Likely secondary to UTI.  - BP in ED 78/51.  Received IVF bolus in ED with good BP response.  - Remains hemodynamically stable, no shock.  - Lactic 2.7, cr. 2.2.  - Afebrile, WBC normal.  - Blood and urine cultures pending.  - IV fluid resuscitation initiated in ED.  - Continuous IVF's.  - Empiric abx with IV Rocephin.  - Lactic normal   -Blood cx show NGTD  -Urine cx pending

## 2018-02-02 NOTE — ASSESSMENT & PLAN NOTE
- Likely due to IVVD.  - IV hydration.  - Strict I&O's.  - Hold home ARB + metformin.  - Repeat BMP in AM.

## 2018-02-02 NOTE — ASSESSMENT & PLAN NOTE
- Hypotensive in ED.  Hemodynamically stable currently.  -  Monitor BP trends and resume as needed.  -BP meds resumed

## 2018-02-02 NOTE — PROGRESS NOTES
[facility] Hospital Medicine  Progress Note    Patient Name: Sailaja Ordaz  MRN: [id number]  Patient Class: IP- Inpatient   Admission Date: 2/1/2018  Length of Stay: 1 days  Attending Physician: Ezekiel Blanca MD  Primary Care Provider: Clark Lu MD        Subjective:     Principal Problem:Severe sepsis    HPI:  Ms. Ordaz is a 62yo female with a PMHx of HTN, hypothyroidism, and DM II, who presented to the ED with c/o fatigue x 1 week.  Associated nausea and decreased appetite/PO intake.  Denies any CP, palpitations, SOB, orthopnea, PND, cough, edema, ABD pain, vomiting, diarrhea, dysuria, hematuria, lightheadedness/dizziness, syncope, AMS, focal deficits, or fever.  She reports being diagnosed with influenza 1 week ago, and has declined each day since.  Upon arrival to ED, patient notably hypotensive with BP 78/51, .  Patient received IVF bolus with good BP response.  Initial work-up in ED resulted BUN 30, cr. 2.2, glucose 284, lactic 2.7.  UA consistent with UTI.  CXR unremarkable.  Hospital Medicine was consulted for admission.  Currently, patient appears comfortable in NAD.  Patient remains hemodynamically stable.    Hospital Course:  63 year old female admitted for severe sepsis. Sepsis protocol including IV hydration initiated in the ED. Most likely secondary to UTI. Patient being treated with IV hydration and IV Rocephin. Patient c/o left knee pain and swelling. Left knee xray showed Questionable avulsion fracture medial femoral condyle in the region of the medial collateral ligament insertion. Knee immobilizer placed. Blood cx show NGTD. Urine cx pending.     Interval History: No acute events overnight. Patient c/o left knee pain and swelling, xray revealed Questionable avulsion fracture medial femoral condyle. Knee immobilizer place. Blood cx show NGTD. Urine cx pending. Will continue current medical management.     Review of Systems   Constitutional: Positive for appetite  change and fatigue. Negative for activity change, chills, diaphoresis, fever and unexpected weight change.   HENT: Negative for congestion, postnasal drip, rhinorrhea, sinus pressure, sore throat and trouble swallowing.    Eyes: Negative for photophobia and visual disturbance.   Respiratory: Negative for apnea, cough, chest tightness, shortness of breath and wheezing.    Cardiovascular: Negative for chest pain, palpitations and leg swelling.   Gastrointestinal: Negative for abdominal distention, abdominal pain, blood in stool, constipation, diarrhea, nausea and vomiting.   Endocrine: Negative for polydipsia, polyphagia and polyuria.   Genitourinary: Negative for decreased urine volume, difficulty urinating, dysuria, flank pain, frequency, hematuria and urgency.   Musculoskeletal: Negative for arthralgias, back pain, gait problem, joint swelling and myalgias.        +left knee pain and swelling    Skin: Negative for pallor, rash and wound.   Neurological: Negative for dizziness, seizures, syncope, facial asymmetry, speech difficulty, weakness, light-headedness, numbness and headaches.   Psychiatric/Behavioral: Negative for confusion. The patient is not nervous/anxious.    All other systems reviewed and are negative.    Objective:     Vital Signs (Most Recent):  Temp: 98 °F (36.7 °C) (02/02/18 1222)  Pulse: 93 (02/02/18 1222)  Resp: 18 (02/02/18 1222)  BP: (!) 160/82 (02/02/18 1222)  SpO2: 96 % (02/02/18 1222) Vital Signs (24h Range):  Temp:  [98 °F (36.7 °C)-99.1 °F (37.3 °C)] 98 °F (36.7 °C)  Pulse:  [] 93  Resp:  [14-22] 18  SpO2:  [94 %-100 %] 96 %  BP: ()/(51-85) 160/82     Weight: 76 kg (167 lb 8.8 oz)  Body mass index is 25.48 kg/m².    Intake/Output Summary (Last 24 hours) at 02/02/18 1406  Last data filed at 02/02/18 1030   Gross per 24 hour   Intake             3834 ml   Output              950 ml   Net             2884 ml      Physical Exam   Constitutional: He is oriented to person, place, and  time. He appears well-developed and well-nourished. No distress.   HENT:   Head: Normocephalic and atraumatic.   Eyes: Conjunctivae are normal.   Neck: Normal range of motion. Neck supple. No JVD present.   Cardiovascular: Normal rate, regular rhythm, S1 normal, S2 normal and intact distal pulses.   No extrasystoles are present. Exam reveals no gallop.    No murmur heard.  Pulses:       Radial pulses are 2+ on the right side, and 2+ on the left side.        Dorsalis pedis pulses are 2+ on the right side, and 2+ on the left side.        Posterior tibial pulses are 2+ on the right side, and 2+ on the left side.   Pulmonary/Chest: Effort normal and breath sounds normal. No accessory muscle usage. No tachypnea. No respiratory distress. He has no decreased breath sounds. He has no wheezes. He has no rhonchi. He has no rales.   Abdominal: Soft. Bowel sounds are normal. He exhibits no distension. There is no tenderness. There is no rebound, no guarding and no CVA tenderness.   Musculoskeletal: Normal range of motion. He exhibits no edema, tenderness or deformity.        Left knee: He exhibits swelling. He exhibits no deformity and no erythema.   LLE: Neurovascularly intact   Neurological: He is alert and oriented to person, place, and time. No cranial nerve deficit or sensory deficit.   Skin: Skin is warm, dry and intact. Capillary refill takes less than 2 seconds. No rash noted. He is not diaphoretic. No cyanosis or erythema. No pallor.   Psychiatric: He has a normal mood and affect. His speech is normal and behavior is normal. Cognition and memory are normal.   Nursing note and vitals reviewed.      Significant Labs:   Blood Culture:   Recent Labs  Lab 02/01/18 1915   LABBLOO No Growth to date  No Growth to date     BMP:   Recent Labs  Lab 02/02/18 0439   *      K 4.6      CO2 29   BUN 24*   CREATININE 1.4   CALCIUM 9.0   MG 1.8     CBC:   Recent Labs  Lab 02/01/18 1916 02/02/18  0439   WBC 12.09  8.17   HGB 13.0* 11.8*   HCT 39.1* 32.6*    294     CMP:   Recent Labs  Lab 02/01/18 1916 02/02/18  0439   * 141   K 4.1 4.6   CL 94* 103   CO2 24 29   * 262*   BUN 30* 24*   CREATININE 2.2* 1.4   CALCIUM 9.7 9.0   PROT 8.0 7.0   ALBUMIN 3.4* 3.0*   BILITOT 0.5 0.2   ALKPHOS 77 69   AST 14 13   ALT 21 17   ANIONGAP 16 9   EGFRNONAA 30.3* 52*     Lactic Acid:   Recent Labs  Lab 02/01/18 1916 02/01/18  2235 02/02/18  0118   LACTATE 2.7* 1.7 1.7     Urine Culture: No results for input(s): LABURIN in the last 48 hours.  Urine Studies:   Recent Labs  Lab 02/01/18  1900   COLORU Julieta   APPEARANCEUA Hazy*   PHUR 6.0   SPECGRAV >=1.030*   PROTEINUA 2+*   GLUCUA Negative   KETONESU 1+*   BILIRUBINUA 1+*   OCCULTUA Negative   NITRITE Negative   UROBILINOGEN Negative   LEUKOCYTESUR Trace*   RBCUA 1   WBCUA 53*   BACTERIA Many*   SQUAMEPITHEL 10   HYALINECASTS 2*     All pertinent labs within the past 24 hours have been reviewed.    Significant Imaging:  Imaging Results          X-Ray Knee 1 or 2 View Left (Final result)  Result time 02/02/18 13:49:22    Final result by Mary Watkins Jr., MD (02/02/18 13:49:22)                 Impression:      Questionable avulsion fracture medial femoral condyle in the region of the medial collateral ligament insertion.  Correlate with pain at the site.       Electronically signed by: MARY WATKINS  Date:     02/02/18  Time:    13:49              Narrative:    Exam: 2 views left knee.    Clinical Indication: Pain and swelling    Findings: Triangular shaped bony density adjacent to the medial femoral condyle may represent an avulsion fracture.  Correlate with pain at this site.  No evidence of a joint effusion.  Narrowing of the patellofemoral joint space.  Vascular calcifications throughout the leg.                             X-Ray Chest AP Portable (Final result)  Result time 02/01/18 19:29:01    Final result by Raymond Crawley MD (02/01/18 19:29:01)                  Impression:         Negative single view chest x-ray.      Electronically signed by: MAN PALACIO MD  Date:     02/01/18  Time:    19:29              Narrative:    Procedure: XR CHEST AP PORTABLE, 02/01/18 19:19:56    Clinical indication:  Cough and fever.  Possible sepsis.    Comparison: None    Findings: Heart size is normal. The lung fields are clear. No acute cardiopulmonary infiltrate.                            Assessment/Plan:      * Severe sepsis    - Likely secondary to UTI.  - BP in ED 78/51.  Received IVF bolus in ED with good BP response.  - Remains hemodynamically stable, no shock.  - Lactic 2.7, cr. 2.2.  - Afebrile, WBC normal.  - Blood and urine cultures pending.  - IV fluid resuscitation initiated in ED.  - Continuous IVF's.  - Empiric abx with IV Rocephin.  - Lactic normal   -Blood cx show NGTD  -Urine cx pending            Type 2 diabetes mellitus, uncontrolled    - Accuchecks with SSI.  - Continue home Levemir 30 units daily.  - Check HbA1c pending         Essential hypertension    - Hypotensive in ED.  Hemodynamically stable currently.  -  Monitor BP trends and resume as needed.  -BP meds resumed         JOSIAH (acute kidney injury)    - Likely due to IVVD.  - IV hydration.  - Strict I&O's.  - Hold home ARB + metformin.  - Improved         Acute cystitis with hematuria    - Urine culture pending.  - IV Rocephin           VTE Risk Mitigation         Ordered     enoxaparin injection 40 mg  Daily     Route:  Subcutaneous        02/02/18 0818     Medium Risk of VTE  Once      02/01/18 2334     Place sequential compression device  Until discontinued      02/01/18 2337              Kimi Mcaario NP  Department of Hospital Medicine   [facility]

## 2018-02-02 NOTE — SUBJECTIVE & OBJECTIVE
Interval History: No acute events overnight. Patient c/o left knee pain and swelling, xray revealed Questionable avulsion fracture medial femoral condyle. Knee immobilizer place. Blood cx show NGTD. Urine cx pending. Will continue current medical management.     Review of Systems   Constitutional: Positive for appetite change and fatigue. Negative for activity change, chills, diaphoresis, fever and unexpected weight change.   HENT: Negative for congestion, postnasal drip, rhinorrhea, sinus pressure, sore throat and trouble swallowing.    Eyes: Negative for photophobia and visual disturbance.   Respiratory: Negative for apnea, cough, chest tightness, shortness of breath and wheezing.    Cardiovascular: Negative for chest pain, palpitations and leg swelling.   Gastrointestinal: Negative for abdominal distention, abdominal pain, blood in stool, constipation, diarrhea, nausea and vomiting.   Endocrine: Negative for polydipsia, polyphagia and polyuria.   Genitourinary: Negative for decreased urine volume, difficulty urinating, dysuria, flank pain, frequency, hematuria and urgency.   Musculoskeletal: Negative for arthralgias, back pain, gait problem, joint swelling and myalgias.        +left knee pain and swelling    Skin: Negative for pallor, rash and wound.   Neurological: Negative for dizziness, seizures, syncope, facial asymmetry, speech difficulty, weakness, light-headedness, numbness and headaches.   Psychiatric/Behavioral: Negative for confusion. The patient is not nervous/anxious.    All other systems reviewed and are negative.    Objective:     Vital Signs (Most Recent):  Temp: 98 °F (36.7 °C) (02/02/18 1222)  Pulse: 93 (02/02/18 1222)  Resp: 18 (02/02/18 1222)  BP: (!) 160/82 (02/02/18 1222)  SpO2: 96 % (02/02/18 1222) Vital Signs (24h Range):  Temp:  [98 °F (36.7 °C)-99.1 °F (37.3 °C)] 98 °F (36.7 °C)  Pulse:  [] 93  Resp:  [14-22] 18  SpO2:  [94 %-100 %] 96 %  BP: ()/(51-85) 160/82     Weight: 76  kg (167 lb 8.8 oz)  Body mass index is 25.48 kg/m².    Intake/Output Summary (Last 24 hours) at 02/02/18 1406  Last data filed at 02/02/18 1030   Gross per 24 hour   Intake             3834 ml   Output              950 ml   Net             2884 ml      Physical Exam   Constitutional: He is oriented to person, place, and time. He appears well-developed and well-nourished. No distress.   HENT:   Head: Normocephalic and atraumatic.   Eyes: Conjunctivae are normal.   Neck: Normal range of motion. Neck supple. No JVD present.   Cardiovascular: Normal rate, regular rhythm, S1 normal, S2 normal and intact distal pulses.   No extrasystoles are present. Exam reveals no gallop.    No murmur heard.  Pulses:       Radial pulses are 2+ on the right side, and 2+ on the left side.        Dorsalis pedis pulses are 2+ on the right side, and 2+ on the left side.        Posterior tibial pulses are 2+ on the right side, and 2+ on the left side.   Pulmonary/Chest: Effort normal and breath sounds normal. No accessory muscle usage. No tachypnea. No respiratory distress. He has no decreased breath sounds. He has no wheezes. He has no rhonchi. He has no rales.   Abdominal: Soft. Bowel sounds are normal. He exhibits no distension. There is no tenderness. There is no rebound, no guarding and no CVA tenderness.   Musculoskeletal: Normal range of motion. He exhibits no edema, tenderness or deformity.        Left knee: He exhibits swelling. He exhibits no deformity and no erythema.   LLE: Neurovascularly intact   Neurological: He is alert and oriented to person, place, and time. No cranial nerve deficit or sensory deficit.   Skin: Skin is warm, dry and intact. Capillary refill takes less than 2 seconds. No rash noted. He is not diaphoretic. No cyanosis or erythema. No pallor.   Psychiatric: He has a normal mood and affect. His speech is normal and behavior is normal. Cognition and memory are normal.   Nursing note and vitals  reviewed.      Significant Labs:   Blood Culture:   Recent Labs  Lab 02/01/18 1915   LABBLOO No Growth to date  No Growth to date     BMP:   Recent Labs  Lab 02/02/18  0439   *      K 4.6      CO2 29   BUN 24*   CREATININE 1.4   CALCIUM 9.0   MG 1.8     CBC:   Recent Labs  Lab 02/01/18 1916 02/02/18  0439   WBC 12.09 8.17   HGB 13.0* 11.8*   HCT 39.1* 32.6*    294     CMP:   Recent Labs  Lab 02/01/18 1916 02/02/18  0439   * 141   K 4.1 4.6   CL 94* 103   CO2 24 29   * 262*   BUN 30* 24*   CREATININE 2.2* 1.4   CALCIUM 9.7 9.0   PROT 8.0 7.0   ALBUMIN 3.4* 3.0*   BILITOT 0.5 0.2   ALKPHOS 77 69   AST 14 13   ALT 21 17   ANIONGAP 16 9   EGFRNONAA 30.3* 52*     Lactic Acid:   Recent Labs  Lab 02/01/18 1916 02/01/18 2235 02/02/18  0118   LACTATE 2.7* 1.7 1.7     Urine Culture: No results for input(s): LABURIN in the last 48 hours.  Urine Studies:   Recent Labs  Lab 02/01/18 1900   COLORU Julieta   APPEARANCEUA Hazy*   PHUR 6.0   SPECGRAV >=1.030*   PROTEINUA 2+*   GLUCUA Negative   KETONESU 1+*   BILIRUBINUA 1+*   OCCULTUA Negative   NITRITE Negative   UROBILINOGEN Negative   LEUKOCYTESUR Trace*   RBCUA 1   WBCUA 53*   BACTERIA Many*   SQUAMEPITHEL 10   HYALINECASTS 2*     All pertinent labs within the past 24 hours have been reviewed.    Significant Imaging:  Imaging Results          X-Ray Knee 1 or 2 View Left (Final result)  Result time 02/02/18 13:49:22    Final result by Mary Watkins Jr., MD (02/02/18 13:49:22)                 Impression:      Questionable avulsion fracture medial femoral condyle in the region of the medial collateral ligament insertion.  Correlate with pain at the site.       Electronically signed by: MARY WATKINS  Date:     02/02/18  Time:    13:49              Narrative:    Exam: 2 views left knee.    Clinical Indication: Pain and swelling    Findings: Triangular shaped bony density adjacent to the medial femoral condyle may represent an  avulsion fracture.  Correlate with pain at this site.  No evidence of a joint effusion.  Narrowing of the patellofemoral joint space.  Vascular calcifications throughout the leg.                             X-Ray Chest AP Portable (Final result)  Result time 02/01/18 19:29:01    Final result by Man Palacio MD (02/01/18 19:29:01)                 Impression:         Negative single view chest x-ray.      Electronically signed by: MAN PALACIO MD  Date:     02/01/18  Time:    19:29              Narrative:    Procedure: XR CHEST AP PORTABLE, 02/01/18 19:19:56    Clinical indication:  Cough and fever.  Possible sepsis.    Comparison: None    Findings: Heart size is normal. The lung fields are clear. No acute cardiopulmonary infiltrate.

## 2018-02-02 NOTE — H&P
[facility] Hospital Medicine  History & Physical    Patient Name: Sailaja Ordaz  MRN: [id number]  Admission Date: 2/1/2018  Attending Physician: Gideon James MD  Primary Care Provider: Clark Lu MD         Patient information was obtained from patient, past medical records and ER records.     Subjective:     Principal Problem:Severe sepsis    Chief Complaint:   Chief Complaint   Patient presents with    Fatigue     diagnosed with the flu 8 days ago and feels very weak and dehydrated.         HPI: Ms. Ordaz is a 62yo male with a PMHx of HTN, hypothyroidism, and DM II, who presented to the ED with c/o fatigue x 1 week.  Associated nausea and decreased appetite/PO intake.  Denies any CP, palpitations, SOB, orthopnea, PND, cough, edema, ABD pain, vomiting, diarrhea, dysuria, hematuria, lightheadedness/dizziness, syncope, AMS, focal deficits, or fever.  He reports being diagnosed with influenza 1 week ago, and has declined each day since.  Upon arrival to ED, patient notably hypotensive with BP 78/51, .  Patient received IVF bolus with good BP response.  Initial work-up in ED resulted BUN 30, cr. 2.2, glucose 284, lactic 2.7.  UA consistent with UTI.  CXR unremarkable.  Hospital Medicine was consulted for admission.  Currently, patient appears comfortable in NAD.  Patient remains hemodynamically stable.      Past Medical History:   Diagnosis Date    Cancer     Depression     Diabetes mellitus     Hyperlipidemia     Hypertension     Neuropathy     Psoriasis     Thyroid disease        Past Surgical History:   Procedure Laterality Date    PROSTATECTOMY         Review of patient's allergies indicates:  No Known Allergies    No current facility-administered medications on file prior to encounter.      Current Outpatient Prescriptions on File Prior to Encounter   Medication Sig    atorvastatin (LIPITOR) 10 MG tablet Take 10 mg by mouth once daily.    calcipotriene-betamethasone  (TACLONEX) external suspension Apply topically once daily.    cinnamon bark (CINNAMON) 500 mg capsule Take 1,000 mg by mouth once daily.    clobetasol (TEMOVATE) 0.05 % external solution Apply topically 2 (two) times daily.    diltiaZEM (CARDIZEM CD) 180 MG 24 hr capsule Take 180 mg by mouth 2 (two) times daily.    etanercept (ENBREL) 25 mg/0.5mL (0.51) Syrg injection Inject 25 mg into the skin once a week.    fish oil-omega-3 fatty acids 300-1,000 mg capsule Take 1 capsule by mouth once daily.    FOLIC ACID/MULTIVIT-MIN/LUTEIN (CENTRUM SILVER ORAL) Take by mouth Daily.    gabapentin (NEURONTIN) 300 MG capsule Take 300 mg by mouth 2 (two) times daily.    glimepiride (AMARYL) 4 MG tablet Take 4 mg by mouth before breakfast.    insulin glargine (LANTUS) 100 unit/mL injection Inject 30 Units into the skin once daily.    levothyroxine (SYNTHROID) 50 MCG tablet Take 50 mcg by mouth once daily.    losartan (COZAAR) 50 MG tablet Take 50 mg by mouth once daily.    metFORMIN (GLUCOPHAGE) 1000 MG tablet Take 1,000 mg by mouth 2 (two) times daily with meals.    oseltamivir (TAMIFLU) 75 MG capsule Take 75 mg by mouth once daily.    solifenacin (VESICARE) 5 MG tablet Take 5 mg by mouth once daily.    venlafaxine (EFFEXOR-XR) 75 MG 24 hr capsule Take 75 mg by mouth once daily.     Family History     None        Social History Main Topics    Smoking status: Never Smoker    Smokeless tobacco: Never Used    Alcohol use No    Drug use: No    Sexual activity: No     Review of Systems   Constitutional: Positive for appetite change and fatigue. Negative for activity change, chills, diaphoresis, fever and unexpected weight change.   HENT: Negative for congestion, postnasal drip, rhinorrhea, sinus pressure, sore throat and trouble swallowing.    Eyes: Negative for photophobia and visual disturbance.   Respiratory: Negative for apnea, cough, chest tightness, shortness of breath and wheezing.    Cardiovascular: Negative  for chest pain, palpitations and leg swelling.   Gastrointestinal: Negative for abdominal distention, abdominal pain, blood in stool, constipation, diarrhea, nausea and vomiting.   Endocrine: Negative for polydipsia, polyphagia and polyuria.   Genitourinary: Negative for decreased urine volume, difficulty urinating, dysuria, flank pain, frequency, hematuria and urgency.   Musculoskeletal: Negative for arthralgias, back pain, gait problem, joint swelling and myalgias.   Skin: Negative for pallor, rash and wound.   Neurological: Negative for dizziness, seizures, syncope, facial asymmetry, speech difficulty, weakness, light-headedness, numbness and headaches.   Psychiatric/Behavioral: Negative for confusion. The patient is not nervous/anxious.    All other systems reviewed and are negative.    Objective:     Vital Signs (Most Recent):  Temp: 98.7 °F (37.1 °C) (02/01/18 2355)  Pulse: 96 (02/01/18 2355)  Resp: 20 (02/01/18 2355)  BP: (!) 161/80 (02/01/18 2355)  SpO2: 98 % (02/01/18 2355) Vital Signs (24h Range):  Temp:  [98.1 °F (36.7 °C)-98.7 °F (37.1 °C)] 98.7 °F (37.1 °C)  Pulse:  [] 96  Resp:  [14-20] 20  SpO2:  [98 %-100 %] 98 %  BP: ()/(51-80) 161/80     Weight: 75.6 kg (166 lb 10.7 oz)  Body mass index is 25.34 kg/m².    Physical Exam   Constitutional: He is oriented to person, place, and time. He appears well-developed and well-nourished. No distress.   HENT:   Head: Normocephalic and atraumatic.   Eyes: Conjunctivae are normal.   Neck: Normal range of motion. Neck supple. No JVD present.   Cardiovascular: Normal rate, regular rhythm, S1 normal, S2 normal and intact distal pulses.   No extrasystoles are present. Exam reveals no gallop.    No murmur heard.  Pulses:       Radial pulses are 2+ on the right side, and 2+ on the left side.        Dorsalis pedis pulses are 2+ on the right side, and 2+ on the left side.        Posterior tibial pulses are 2+ on the right side, and 2+ on the left side.    Pulmonary/Chest: Effort normal and breath sounds normal. No accessory muscle usage. No tachypnea. No respiratory distress. He has no decreased breath sounds. He has no wheezes. He has no rhonchi. He has no rales.   Abdominal: Soft. Bowel sounds are normal. He exhibits no distension. There is no tenderness. There is no rebound, no guarding and no CVA tenderness.   Musculoskeletal: Normal range of motion. He exhibits no edema, tenderness or deformity.   Neurological: He is alert and oriented to person, place, and time. No cranial nerve deficit or sensory deficit.   Skin: Skin is warm, dry and intact. Capillary refill takes less than 2 seconds. No rash noted. He is not diaphoretic. No cyanosis or erythema. No pallor.   Psychiatric: He has a normal mood and affect. His speech is normal and behavior is normal. Cognition and memory are normal.   Nursing note and vitals reviewed.          Significant Labs:   Results for orders placed or performed during the hospital encounter of 02/01/18   APTT   Result Value Ref Range    aPTT 28.4 21.0 - 32.0 sec   Brain natriuretic peptide   Result Value Ref Range    BNP <10 0 - 99 pg/mL   CBC auto differential   Result Value Ref Range    WBC 12.09 3.90 - 12.70 K/uL    RBC 4.09 (L) 4.60 - 6.20 M/uL    Hemoglobin 13.0 (L) 14.0 - 18.0 g/dL    Hematocrit 39.1 (L) 40.0 - 54.0 %    MCV 96 82 - 98 fL    MCH 31.8 (H) 27.0 - 31.0 pg    MCHC 33.2 32.0 - 36.0 g/dL    RDW 13.5 11.5 - 14.5 %    Platelets 342 150 - 350 K/uL    MPV 10.8 9.2 - 12.9 fL    Gran # (ANC) 8.4 (H) 1.8 - 7.7 K/uL    Lymph # 2.2 1.0 - 4.8 K/uL    Mono # 1.3 (H) 0.3 - 1.0 K/uL    Eos # 0.1 0.0 - 0.5 K/uL    Baso # 0.02 0.00 - 0.20 K/uL    Gran% 69.2 38.0 - 73.0 %    Lymph% 18.5 18.0 - 48.0 %    Mono% 10.7 4.0 - 15.0 %    Eosinophil% 0.9 0.0 - 8.0 %    Basophil% 0.2 0.0 - 1.9 %    Differential Method Automated    Comprehensive metabolic panel   Result Value Ref Range    Sodium 134 (L) 136 - 145 mmol/L    Potassium 4.1 3.5 -  5.1 mmol/L    Chloride 94 (L) 95 - 110 mmol/L    CO2 24 23 - 29 mmol/L    Glucose 284 (H) 70 - 110 mg/dL    BUN, Bld 30 (H) 8 - 23 mg/dL    Creatinine 2.2 (H) 0.5 - 1.4 mg/dL    Calcium 9.7 8.7 - 10.5 mg/dL    Total Protein 8.0 6.0 - 8.4 g/dL    Albumin 3.4 (L) 3.5 - 5.2 g/dL    Total Bilirubin 0.5 0.1 - 1.0 mg/dL    Alkaline Phosphatase 77 55 - 135 U/L    AST 14 10 - 40 U/L    ALT 21 10 - 44 U/L    Anion Gap 16 8 - 16 mmol/L    eGFR if African American 35.0 (A) >60 mL/min/1.73 m^2    eGFR if non  30.3 (A) >60 mL/min/1.73 m^2   Lactic acid, plasma #1   Result Value Ref Range    Lactate (Lactic Acid) 2.7 (H) 0.5 - 2.2 mmol/L   Lipase   Result Value Ref Range    Lipase 31 4 - 60 U/L   Magnesium   Result Value Ref Range    Magnesium 1.9 1.6 - 2.6 mg/dL   Phosphorus   Result Value Ref Range    Phosphorus 3.8 2.7 - 4.5 mg/dL   Protime-INR   Result Value Ref Range    Prothrombin Time 10.7 9.0 - 12.5 sec    INR 1.0 0.8 - 1.2   Troponin I   Result Value Ref Range    Troponin I <0.006 0.000 - 0.026 ng/mL   TSH   Result Value Ref Range    TSH 4.190 (H) 0.400 - 4.000 uIU/mL   Urinalysis   Result Value Ref Range    Specimen UA Urine, Clean Catch     Color, UA [place] Yellow, Straw, Juli[place]    Appearance, UA Hazy (A) Clear    pH, UA 6.0 5.0 - 8.0    Specific Gravity, UA >=1.030 (A) 1.005 - 1.030    Protein, UA 2+ (A) Negative    Glucose, UA Negative Negative    Ketones, UA 1+ (A) Negative    Bilirubin (UA) 1+ (A) Negative    Occult Blood UA Negative Negative    Nitrite, UA Negative Negative    Urobilinogen, UA Negative <2.0 EU/dL    Leukocytes, UA Trace (A) Negative   Urinalysis Microscopic   Result Value Ref Range    RBC, UA 1 0 - 4 /hpf    WBC, UA 53 (H) 0 - 5 /hpf    Bacteria, UA Many (A) None-Occ /hpf    Squam Epithel, UA 10 /hpf    Hyaline Casts, UA 2 (A) 0-1/lpf /lpf    Microscopic Comment SEE COMMENT    T4, free   Result Value Ref Range    Free T4 0.99 0.71 - 1.51 ng/dL   Lactic acid, plasma #2   Result Value  Ref Range    Lactate (Lactic Acid) 1.7 0.5 - 2.2 mmol/L      All pertinent labs within the past 24 hours have been reviewed.    Significant Imaging:   Imaging Results          X-Ray Chest AP Portable (Final result)  Result time 02/01/18 19:29:01    Final result by Man Palacio MD (02/01/18 19:29:01)                 Impression:         Negative single view chest x-ray.      Electronically signed by: MAN PALACIO MD  Date:     02/01/18  Time:    19:29              Narrative:    Procedure: XR CHEST AP PORTABLE, 02/01/18 19:19:56    Clinical indication:  Cough and fever.  Possible sepsis.    Comparison: None    Findings: Heart size is normal. The lung fields are clear. No acute cardiopulmonary infiltrate.                             I have reviewed all pertinent imaging results/findings within the past 24 hours.        Assessment/Plan:     * Severe sepsis    - Likely secondary to UTI.  - BP in ED 78/51.  Received IVF bolus in ED with good BP response.  - Remains hemodynamically stable, no shock.  - Lactic 2.7, cr. 2.2.  - Afebrile, WBC normal.  - Blood and urine cultures pending.  - IV fluid resuscitation initiated in ED.  - Continuous IVF's.  - Empiric abx with IV Rocephin.  - Follow serial labs.          Acute cystitis with hematuria    - Urine culture pending.  - Empiric abx as above.        Type 2 diabetes mellitus, uncontrolled    - Accuchecks with SSI.  - Continue home Levemir 30 units daily.  - Check HbA1c.        Essential hypertension    - Hypotensive in ED.  Hemodynamically stable currently.  - Hold home antihypertensives for now.  Monitor BP trends and resume as needed.        JOSIAH (acute kidney injury)    - Likely due to IVVD.  - IV hydration.  - Strict I&O's.  - Hold home ARB + metformin.  - Repeat BMP in AM.          VTE Risk Mitigation         Ordered     Medium Risk of VTE  Once      02/01/18 2334     Place sequential compression device  Until discontinued     Lovenox 02/01/18 0392              Abby Gonzalez, RANDIP  Department of Hospital Medicine   [facility]

## 2018-02-02 NOTE — PROGRESS NOTES
Pharmacist Renal Dose Adjustment Note    Sailaja Ordaz is a 63 y.o. male being treated with the medication Lovenox    Patient Data:    Vital Signs (Most Recent):  Temp: 99.1 °F (37.3 °C) (02/02/18 0429)  Pulse: 106 (02/02/18 0429)  Resp: 18 (02/02/18 0429)  BP: (!) 174/83 (02/02/18 0429)  SpO2: (!) 94 % (02/02/18 0429)   Vital Signs (72h Range):  Temp:  [98.1 °F (36.7 °C)-99.1 °F (37.3 °C)]   Pulse:  []   Resp:  [14-20]   BP: ()/(51-83)   SpO2:  [94 %-100 %]        Recent Labs     Lab 02/01/18  1916 02/02/18 0439   CREATININE 2.2* 1.4     Serum creatinine: 1.4 mg/dL 02/02/18 0439  Estimated creatinine clearance: 50.9 mL/min    Medication:Lovenox dose: 30mg frequency daily will be changed to medication:Lovenox dose:40mg frequency:daily    Pharmacist's Name: SHARI RADER  Pharmacist's Extension: [phone]

## 2018-02-02 NOTE — ED NOTES
[facility] medicine ([facility]) paged at this time per Dr. Casarez's request.

## 2018-02-02 NOTE — NURSING
Pt reports having had head pressure with ringing in his ears this morning. He states that he has had it before prior to hospitalization.

## 2018-02-02 NOTE — PLAN OF CARE
Met with pt to complete discharge planning assessment. Pt's wife passed away two years ago, so he lives alone. He is independent with his ADLs and does not anticipate and discharge needs at this time.  Report given to ERIN Bee LCSW.     02/02/18 1438   Discharge Assessment   Assessment Type Discharge Planning Assessment   Confirmed/corrected address and phone number on facesheet? Yes   Assessment information obtained from? Patient   Prior to hospitilization cognitive status: Alert/Oriented   Prior to hospitalization functional status: Independent   Current cognitive status: Alert/Oriented   Current Functional Status: Independent   Lives With alone   Able to Return to Prior Arrangements yes   Is patient able to care for self after discharge? Yes   Who are your caregiver(s) and their phone number(s)? Daughter, Ayanna [phone]   Readmission Within The Last 30 Days no previous admission in last 30 days   Patient currently being followed by outpatient case management? No   Patient currently receives any other outside agency services? No   Equipment Currently Used at Home none   Does the patient have transportation home? Yes   Transportation Available car;family or friend will provide   Discharge Plan A Home   Patient/Family In Agreement With Plan yes

## 2018-02-02 NOTE — SUBJECTIVE & OBJECTIVE
Past Medical History:   Diagnosis Date    Cancer     Depression     Diabetes mellitus     Hyperlipidemia     Hypertension     Neuropathy     Psoriasis     Thyroid disease        Past Surgical History:   Procedure Laterality Date    PROSTATECTOMY         Review of patient's allergies indicates:  No Known Allergies    No current facility-administered medications on file prior to encounter.      Current Outpatient Prescriptions on File Prior to Encounter   Medication Sig    atorvastatin (LIPITOR) 10 MG tablet Take 10 mg by mouth once daily.    calcipotriene-betamethasone (TACLONEX) external suspension Apply topically once daily.    cinnamon bark (CINNAMON) 500 mg capsule Take 1,000 mg by mouth once daily.    clobetasol (TEMOVATE) 0.05 % external solution Apply topically 2 (two) times daily.    diltiaZEM (CARDIZEM CD) 180 MG 24 hr capsule Take 180 mg by mouth 2 (two) times daily.    etanercept (ENBREL) 25 mg/0.5mL (0.51) Syrg injection Inject 25 mg into the skin once a week.    fish oil-omega-3 fatty acids 300-1,000 mg capsule Take 1 capsule by mouth once daily.    FOLIC ACID/MULTIVIT-MIN/LUTEIN (CENTRUM SILVER ORAL) Take by mouth Daily.    gabapentin (NEURONTIN) 300 MG capsule Take 300 mg by mouth 2 (two) times daily.    glimepiride (AMARYL) 4 MG tablet Take 4 mg by mouth before breakfast.    insulin glargine (LANTUS) 100 unit/mL injection Inject 30 Units into the skin once daily.    levothyroxine (SYNTHROID) 50 MCG tablet Take 50 mcg by mouth once daily.    losartan (COZAAR) 50 MG tablet Take 50 mg by mouth once daily.    metFORMIN (GLUCOPHAGE) 1000 MG tablet Take 1,000 mg by mouth 2 (two) times daily with meals.    oseltamivir (TAMIFLU) 75 MG capsule Take 75 mg by mouth once daily.    solifenacin (VESICARE) 5 MG tablet Take 5 mg by mouth once daily.    venlafaxine (EFFEXOR-XR) 75 MG 24 hr capsule Take 75 mg by mouth once daily.     Family History     None        Social History Main Topics     Smoking status: Never Smoker    Smokeless tobacco: Never Used    Alcohol use No    Drug use: No    Sexual activity: No     Review of Systems   Constitutional: Positive for appetite change and fatigue. Negative for activity change, chills, diaphoresis, fever and unexpected weight change.   HENT: Negative for congestion, postnasal drip, rhinorrhea, sinus pressure, sore throat and trouble swallowing.    Eyes: Negative for photophobia and visual disturbance.   Respiratory: Negative for apnea, cough, chest tightness, shortness of breath and wheezing.    Cardiovascular: Negative for chest pain, palpitations and leg swelling.   Gastrointestinal: Negative for abdominal distention, abdominal pain, blood in stool, constipation, diarrhea, nausea and vomiting.   Endocrine: Negative for polydipsia, polyphagia and polyuria.   Genitourinary: Negative for decreased urine volume, difficulty urinating, dysuria, flank pain, frequency, hematuria and urgency.   Musculoskeletal: Negative for arthralgias, back pain, gait problem, joint swelling and myalgias.   Skin: Negative for pallor, rash and wound.   Neurological: Negative for dizziness, seizures, syncope, facial asymmetry, speech difficulty, weakness, light-headedness, numbness and headaches.   Psychiatric/Behavioral: Negative for confusion. The patient is not nervous/anxious.    All other systems reviewed and are negative.    Objective:     Vital Signs (Most Recent):  Temp: 98.7 °F (37.1 °C) (02/01/18 2355)  Pulse: 96 (02/01/18 2355)  Resp: 20 (02/01/18 2355)  BP: (!) 161/80 (02/01/18 2355)  SpO2: 98 % (02/01/18 2355) Vital Signs (24h Range):  Temp:  [98.1 °F (36.7 °C)-98.7 °F (37.1 °C)] 98.7 °F (37.1 °C)  Pulse:  [] 96  Resp:  [14-20] 20  SpO2:  [98 %-100 %] 98 %  BP: ()/(51-80) 161/80     Weight: 75.6 kg (166 lb 10.7 oz)  Body mass index is 25.34 kg/m².    Physical Exam   Constitutional: He is oriented to person, place, and time. He appears well-developed and  well-nourished. No distress.   HENT:   Head: Normocephalic and atraumatic.   Eyes: Conjunctivae are normal.   Neck: Normal range of motion. Neck supple. No JVD present.   Cardiovascular: Normal rate, regular rhythm, S1 normal, S2 normal and intact distal pulses.   No extrasystoles are present. Exam reveals no gallop.    No murmur heard.  Pulses:       Radial pulses are 2+ on the right side, and 2+ on the left side.        Dorsalis pedis pulses are 2+ on the right side, and 2+ on the left side.        Posterior tibial pulses are 2+ on the right side, and 2+ on the left side.   Pulmonary/Chest: Effort normal and breath sounds normal. No accessory muscle usage. No tachypnea. No respiratory distress. He has no decreased breath sounds. He has no wheezes. He has no rhonchi. He has no rales.   Abdominal: Soft. Bowel sounds are normal. He exhibits no distension. There is no tenderness. There is no rebound, no guarding and no CVA tenderness.   Musculoskeletal: Normal range of motion. He exhibits no edema, tenderness or deformity.   Neurological: He is alert and oriented to person, place, and time. No cranial nerve deficit or sensory deficit.   Skin: Skin is warm, dry and intact. Capillary refill takes less than 2 seconds. No rash noted. He is not diaphoretic. No cyanosis or erythema. No pallor.   Psychiatric: He has a normal mood and affect. His speech is normal and behavior is normal. Cognition and memory are normal.   Nursing note and vitals reviewed.          Significant Labs:   Results for orders placed or performed during the hospital encounter of 02/01/18   APTT   Result Value Ref Range    aPTT 28.4 21.0 - 32.0 sec   Brain natriuretic peptide   Result Value Ref Range    BNP <10 0 - 99 pg/mL   CBC auto differential   Result Value Ref Range    WBC 12.09 3.90 - 12.70 K/uL    RBC 4.09 (L) 4.60 - 6.20 M/uL    Hemoglobin 13.0 (L) 14.0 - 18.0 g/dL    Hematocrit 39.1 (L) 40.0 - 54.0 %    MCV 96 82 - 98 fL    MCH 31.8 (H) 27.0  - 31.0 pg    MCHC 33.2 32.0 - 36.0 g/dL    RDW 13.5 11.5 - 14.5 %    Platelets 342 150 - 350 K/uL    MPV 10.8 9.2 - 12.9 fL    Gran # (ANC) 8.4 (H) 1.8 - 7.7 K/uL    Lymph # 2.2 1.0 - 4.8 K/uL    Mono # 1.3 (H) 0.3 - 1.0 K/uL    Eos # 0.1 0.0 - 0.5 K/uL    Baso # 0.02 0.00 - 0.20 K/uL    Gran% 69.2 38.0 - 73.0 %    Lymph% 18.5 18.0 - 48.0 %    Mono% 10.7 4.0 - 15.0 %    Eosinophil% 0.9 0.0 - 8.0 %    Basophil% 0.2 0.0 - 1.9 %    Differential Method Automated    Comprehensive metabolic panel   Result Value Ref Range    Sodium 134 (L) 136 - 145 mmol/L    Potassium 4.1 3.5 - 5.1 mmol/L    Chloride 94 (L) 95 - 110 mmol/L    CO2 24 23 - 29 mmol/L    Glucose 284 (H) 70 - 110 mg/dL    BUN, Bld 30 (H) 8 - 23 mg/dL    Creatinine 2.2 (H) 0.5 - 1.4 mg/dL    Calcium 9.7 8.7 - 10.5 mg/dL    Total Protein 8.0 6.0 - 8.4 g/dL    Albumin 3.4 (L) 3.5 - 5.2 g/dL    Total Bilirubin 0.5 0.1 - 1.0 mg/dL    Alkaline Phosphatase 77 55 - 135 U/L    AST 14 10 - 40 U/L    ALT 21 10 - 44 U/L    Anion Gap 16 8 - 16 mmol/L    eGFR if African American 35.0 (A) >60 mL/min/1.73 m^2    eGFR if non  30.3 (A) >60 mL/min/1.73 m^2   Lactic acid, plasma #1   Result Value Ref Range    Lactate (Lactic Acid) 2.7 (H) 0.5 - 2.2 mmol/L   Lipase   Result Value Ref Range    Lipase 31 4 - 60 U/L   Magnesium   Result Value Ref Range    Magnesium 1.9 1.6 - 2.6 mg/dL   Phosphorus   Result Value Ref Range    Phosphorus 3.8 2.7 - 4.5 mg/dL   Protime-INR   Result Value Ref Range    Prothrombin Time 10.7 9.0 - 12.5 sec    INR 1.0 0.8 - 1.2   Troponin I   Result Value Ref Range    Troponin I <0.006 0.000 - 0.026 ng/mL   TSH   Result Value Ref Range    TSH 4.190 (H) 0.400 - 4.000 uIU/mL   Urinalysis   Result Value Ref Range    Specimen UA Urine, Clean Catch     Color, UA [place] Yellow, Straw, [place]    Appearance, UA Hazy (A) Clear    pH, UA 6.0 5.0 - 8.0    Specific Gravity, UA >=1.030 (A) 1.005 - 1.030    Protein, UA 2+ (A) Negative    Glucose, UA Negative  Negative    Ketones, UA 1+ (A) Negative    Bilirubin (UA) 1+ (A) Negative    Occult Blood UA Negative Negative    Nitrite, UA Negative Negative    Urobilinogen, UA Negative <2.0 EU/dL    Leukocytes, UA Trace (A) Negative   Urinalysis Microscopic   Result Value Ref Range    RBC, UA 1 0 - 4 /hpf    WBC, UA 53 (H) 0 - 5 /hpf    Bacteria, UA Many (A) None-Occ /hpf    Squam Epithel, UA 10 /hpf    Hyaline Casts, UA 2 (A) 0-1/lpf /lpf    Microscopic Comment SEE COMMENT    T4, free   Result Value Ref Range    Free T4 0.99 0.71 - 1.51 ng/dL   Lactic acid, plasma #2   Result Value Ref Range    Lactate (Lactic Acid) 1.7 0.5 - 2.2 mmol/L      All pertinent labs within the past 24 hours have been reviewed.    Significant Imaging:   Imaging Results          X-Ray Chest AP Portable (Final result)  Result time 02/01/18 19:29:01    Final result by Man Palacio MD (02/01/18 19:29:01)                 Impression:         Negative single view chest x-ray.      Electronically signed by: MAN PALACIO MD  Date:     02/01/18  Time:    19:29              Narrative:    Procedure: XR CHEST AP PORTABLE, 02/01/18 19:19:56    Clinical indication:  Cough and fever.  Possible sepsis.    Comparison: None    Findings: Heart size is normal. The lung fields are clear. No acute cardiopulmonary infiltrate.                             I have reviewed all pertinent imaging results/findings within the past 24 hours.

## 2018-02-02 NOTE — ASSESSMENT & PLAN NOTE
- Hypotensive in ED.  Hemodynamically stable currently.  - Hold home antihypertensives for now.  Monitor BP trends and resume as needed.

## 2018-02-02 NOTE — ED PROVIDER NOTES
Encounter Date: 2/1/2018       History     Chief Complaint   Patient presents with    Fatigue     diagnosed with the flu 8 days ago and feels very weak and dehydrated.      The history is provided by the patient.   Fatigue   This is a new problem. The current episode started more than 1 week ago. The problem occurs constantly. The problem has not changed since onset.Pertinent negatives include no chest pain, no abdominal pain, no headaches and no shortness of breath. Nothing aggravates the symptoms. Nothing relieves the symptoms. He has tried nothing for the symptoms.     Review of patient's allergies indicates:  No Known Allergies  Past Medical History:   Diagnosis Date    Depression     Diabetes mellitus     Hyperlipidemia     Hypertension     Neuropathy     Psoriasis     Thyroid disease      History reviewed. No pertinent surgical history.  History reviewed. No pertinent family history.  Social History   Substance Use Topics    Smoking status: Never Smoker    Smokeless tobacco: Never Used    Alcohol use Not on file     Review of Systems   Constitutional: Positive for fatigue. Negative for fever.   HENT: Negative for sore throat.    Respiratory: Negative for shortness of breath.    Cardiovascular: Negative for chest pain.   Gastrointestinal: Positive for nausea. Negative for abdominal pain and vomiting.   Genitourinary: Negative for dysuria.   Musculoskeletal: Negative for back pain.   Skin: Negative for rash.   Neurological: Negative for weakness and headaches.   Hematological: Does not bruise/bleed easily.       Physical Exam     Initial Vitals [02/01/18 1844]   BP Pulse Resp Temp SpO2   (!) 78/51 104 20 98.1 °F (36.7 °C) 99 %      MAP       60         Physical Exam    Nursing note and vitals reviewed.  Constitutional: He appears well-developed and well-nourished. No distress.   HENT:   Head: Normocephalic and atraumatic.   Mouth/Throat: Oropharynx is clear and moist.   Eyes: Conjunctivae and EOM are  normal. Pupils are equal, round, and reactive to light.   Neck: Normal range of motion. Neck supple.   Cardiovascular: Regular rhythm and normal heart sounds. Tachycardia present.  Exam reveals no gallop and no friction rub.    No murmur heard.  Pulmonary/Chest: Breath sounds normal. No respiratory distress. He has no wheezes. He has no rhonchi. He has no rales.   Abdominal: Soft. Bowel sounds are normal. He exhibits no distension and no mass. There is no tenderness. There is no rebound and no guarding.   Musculoskeletal: Normal range of motion. He exhibits no edema.   Neurological: He is alert and oriented to person, place, and time. He has normal strength.   Skin: Skin is warm and dry. No rash noted.   Psychiatric: He has a normal mood and affect. Thought content normal.         ED Course   Procedures  Labs Reviewed   CBC W/ AUTO DIFFERENTIAL - Abnormal; Notable for the following:        Result Value    RBC 4.09 (*)     Hemoglobin 13.0 (*)     Hematocrit 39.1 (*)     MCH 31.8 (*)     Gran # (ANC) 8.4 (*)     Mono # 1.3 (*)     All other components within normal limits   COMPREHENSIVE METABOLIC PANEL - Abnormal; Notable for the following:     Sodium 134 (*)     Chloride 94 (*)     Glucose 284 (*)     BUN, Bld 30 (*)     Creatinine 2.2 (*)     Albumin 3.4 (*)     eGFR if  35.0 (*)     eGFR if non  30.3 (*)     All other components within normal limits   LACTIC ACID, PLASMA - Abnormal; Notable for the following:     Lactate (Lactic Acid) 2.7 (*)     All other components within normal limits   TSH - Abnormal; Notable for the following:     TSH 4.190 (*)     All other components within normal limits   URINALYSIS - Abnormal; Notable for the following:     Appearance, UA Hazy (*)     Specific Gravity, UA >=1.030 (*)     Protein, UA 2+ (*)     Ketones, UA 1+ (*)     Bilirubin (UA) 1+ (*)     Leukocytes, UA Trace (*)     All other components within normal limits   URINALYSIS MICROSCOPIC -  Abnormal; Notable for the following:     WBC, UA 53 (*)     Bacteria, UA Many (*)     Hyaline Casts, UA 2 (*)     All other components within normal limits   CULTURE, BLOOD   CULTURE, BLOOD   CULTURE, URINE   APTT   B-TYPE NATRIURETIC PEPTIDE   LIPASE   MAGNESIUM   PHOSPHORUS   PROTIME-INR   TROPONIN I   T4, FREE        ED Vital Signs:  Vitals:    02/01/18 1844 02/01/18 1854 02/01/18 1857 02/01/18 1858   BP: (!) 78/51 100/60  127/66   Pulse: 104  92 92   Resp: 20   20   Temp: 98.1 °F (36.7 °C)      TempSrc: Oral      SpO2: 99%   100%   Weight: 74.8 kg (165 lb)       02/01/18 1859 02/01/18 1920 02/01/18 1941   BP:  126/65 132/74   Pulse: 91 93 89   Resp:  14 20   Temp:      TempSrc:      SpO2:  100% 100%   Weight:            Abnormal Lab Results:  Labs Reviewed   CBC W/ AUTO DIFFERENTIAL - Abnormal; Notable for the following:        Result Value    RBC 4.09 (*)     Hemoglobin 13.0 (*)     Hematocrit 39.1 (*)     MCH 31.8 (*)     Gran # (ANC) 8.4 (*)     Mono # 1.3 (*)     All other components within normal limits   COMPREHENSIVE METABOLIC PANEL - Abnormal; Notable for the following:     Sodium 134 (*)     Chloride 94 (*)     Glucose 284 (*)     BUN, Bld 30 (*)     Creatinine 2.2 (*)     Albumin 3.4 (*)     eGFR if  35.0 (*)     eGFR if non  30.3 (*)     All other components within normal limits   LACTIC ACID, PLASMA - Abnormal; Notable for the following:     Lactate (Lactic Acid) 2.7 (*)     All other components within normal limits   TSH - Abnormal; Notable for the following:     TSH 4.190 (*)     All other components within normal limits   URINALYSIS - Abnormal; Notable for the following:     Appearance, UA Hazy (*)     Specific Gravity, UA >=1.030 (*)     Protein, UA 2+ (*)     Ketones, UA 1+ (*)     Bilirubin (UA) 1+ (*)     Leukocytes, UA Trace (*)     All other components within normal limits   URINALYSIS MICROSCOPIC - Abnormal; Notable for the following:     WBC, UA 53 (*)      Bacteria, UA Many (*)     Hyaline Casts, UA 2 (*)     All other components within normal limits   CULTURE, BLOOD   CULTURE, BLOOD   CULTURE, URINE   APTT   B-TYPE NATRIURETIC PEPTIDE   LIPASE   MAGNESIUM   PHOSPHORUS   PROTIME-INR   TROPONIN I   T4, FREE          All Lab Results:  Results for orders placed or performed during the hospital encounter of 02/01/18   APTT   Result Value Ref Range    aPTT 28.4 21.0 - 32.0 sec   Brain natriuretic peptide   Result Value Ref Range    BNP <10 0 - 99 pg/mL   CBC auto differential   Result Value Ref Range    WBC 12.09 3.90 - 12.70 K/uL    RBC 4.09 (L) 4.60 - 6.20 M/uL    Hemoglobin 13.0 (L) 14.0 - 18.0 g/dL    Hematocrit 39.1 (L) 40.0 - 54.0 %    MCV 96 82 - 98 fL    MCH 31.8 (H) 27.0 - 31.0 pg    MCHC 33.2 32.0 - 36.0 g/dL    RDW 13.5 11.5 - 14.5 %    Platelets 342 150 - 350 K/uL    MPV 10.8 9.2 - 12.9 fL    Gran # (ANC) 8.4 (H) 1.8 - 7.7 K/uL    Lymph # 2.2 1.0 - 4.8 K/uL    Mono # 1.3 (H) 0.3 - 1.0 K/uL    Eos # 0.1 0.0 - 0.5 K/uL    Baso # 0.02 0.00 - 0.20 K/uL    Gran% 69.2 38.0 - 73.0 %    Lymph% 18.5 18.0 - 48.0 %    Mono% 10.7 4.0 - 15.0 %    Eosinophil% 0.9 0.0 - 8.0 %    Basophil% 0.2 0.0 - 1.9 %    Differential Method Automated    Comprehensive metabolic panel   Result Value Ref Range    Sodium 134 (L) 136 - 145 mmol/L    Potassium 4.1 3.5 - 5.1 mmol/L    Chloride 94 (L) 95 - 110 mmol/L    CO2 24 23 - 29 mmol/L    Glucose 284 (H) 70 - 110 mg/dL    BUN, Bld 30 (H) 8 - 23 mg/dL    Creatinine 2.2 (H) 0.5 - 1.4 mg/dL    Calcium 9.7 8.7 - 10.5 mg/dL    Total Protein 8.0 6.0 - 8.4 g/dL    Albumin 3.4 (L) 3.5 - 5.2 g/dL    Total Bilirubin 0.5 0.1 - 1.0 mg/dL    Alkaline Phosphatase 77 55 - 135 U/L    AST 14 10 - 40 U/L    ALT 21 10 - 44 U/L    Anion Gap 16 8 - 16 mmol/L    eGFR if African American 35.0 (A) >60 mL/min/1.73 m^2    eGFR if non  30.3 (A) >60 mL/min/1.73 m^2   Lactic acid, plasma #1   Result Value Ref Range    Lactate (Lactic Acid) 2.7 (H) 0.5 -  2.2 mmol/L   Lipase   Result Value Ref Range    Lipase 31 4 - 60 U/L   Magnesium   Result Value Ref Range    Magnesium 1.9 1.6 - 2.6 mg/dL   Phosphorus   Result Value Ref Range    Phosphorus 3.8 2.7 - 4.5 mg/dL   Protime-INR   Result Value Ref Range    Prothrombin Time 10.7 9.0 - 12.5 sec    INR 1.0 0.8 - 1.2   Troponin I   Result Value Ref Range    Troponin I <0.006 0.000 - 0.026 ng/mL   TSH   Result Value Ref Range    TSH 4.190 (H) 0.400 - 4.000 uIU/mL   Urinalysis   Result Value Ref Range    Specimen UA Urine, Clean Catch     Color, UA [place] Yellow, Straw, [place]    Appearance, UA Hazy (A) Clear    pH, UA 6.0 5.0 - 8.0    Specific Gravity, UA >=1.030 (A) 1.005 - 1.030    Protein, UA 2+ (A) Negative    Glucose, UA Negative Negative    Ketones, UA 1+ (A) Negative    Bilirubin (UA) 1+ (A) Negative    Occult Blood UA Negative Negative    Nitrite, UA Negative Negative    Urobilinogen, UA Negative <2.0 EU/dL    Leukocytes, UA Trace (A) Negative   Urinalysis Microscopic   Result Value Ref Range    RBC, UA 1 0 - 4 /hpf    WBC, UA 53 (H) 0 - 5 /hpf    Bacteria, UA Many (A) None-Occ /hpf    Squam Epithel, UA 10 /hpf    Hyaline Casts, UA 2 (A) 0-1/lpf /lpf    Microscopic Comment SEE COMMENT    T4, free   Result Value Ref Range    Free T4 0.99 0.71 - 1.51 ng/dL           Imaging Results:  Imaging Results          X-Ray Chest AP Portable (Final result)  Result time 02/01/18 19:29:01    Final result by Man Palacio MD (02/01/18 19:29:01)                 Impression:         Negative single view chest x-ray.      Electronically signed by: MAN PALACIO MD  Date:     02/01/18  Time:    19:29              Narrative:    Procedure: XR CHEST AP PORTABLE, 02/01/18 19:19:56    Clinical indication:  Cough and fever.  Possible sepsis.    Comparison: None    Findings: Heart size is normal. The lung fields are clear. No acute cardiopulmonary infiltrate.                                 The Emergency Provider reviewed the vital  signs and test results, which are outlined above.    ED Discussions:  8:06 PM: Re-evaluated pt. I have discussed test results, shared treatment plan, and the need for admission with patient and family at bedside. Pt and family express understanding at this time and agree with all information. All questions answered. Pt and family have no further questions or concerns at this time. Pt is ready for admit.    All historical, clinical, radiographic, and laboratory findings were reviewed with the patient/family in detail along with the indications for transport to the facility in [place] in order to receive iv fluids and antibiotics.  All remaining questions and concerns were addressed at this time and the patient/family communicates understanding and agrees to proceed accordingly.  Similarly, all pertinent details of the encounter were discussed with Dr. James via Abby with hospital medicine who agrees to receive the patient at [facility] for further care as outlined above.  The [facility] secondary to a need for ongoing iv fluids en route.  Amadeo Casarez MD  8:47 PM                               ED Course      Clinical Impression:       ICD-10-CM ICD-9-CM   1. Lactic acidosis E87.2 276.2   2. Weakness R53.1 780.79   3. Acute cystitis with hematuria N30.01 595.0         Disposition:   Disposition: Admitted  Condition: Fair                        Amadeo Casarez MD  02/01/18 2051

## 2018-02-02 NOTE — PHYSICIAN QUERY
"PT Name: Sailaja Ordaz  MR #: [id number]     Physician Query Form - Documentation Clarification      CDS: Irais Ventura RN, CCDS         Contact information :[phone]  [email or web address]       This form is a permanent document in the medical record.     Query Date: February 2, 2018    By submitting this query, we are merely seeking further clarification of documentation. Please utilize your independent clinical judgment when addressing the question(s) below.    The Medical record reflects the following:    Supporting Clinical Findings Location in Medical Record     "Type 2 diabetes mellitus, uncontrolled"    Glucose 284-262  Point of Care  glucose testing  224  HgbA1C 7.2       H&P 2/2/18    Lab 2/1/-2/2/18    Lab 2/2/18                                                                                Doctor, Please specify diagnosis or diagnoses associated with above clinical findings.  Please clarify meaning of Type 2 Diabetes Mellitus uncontrolled    Provider Use Only      __X__Type 2 Diabetes Mellitus with hyperglycemia      ____other meaning, _____                                                                                                                         [  ] Clinically undetermined            "

## 2018-02-02 NOTE — H&P
Tissue Perfusion Assessment        Vital signs reviewed. A focused perfusion assessment was completed.      Abby Gonzalez

## 2018-02-02 NOTE — HPI
Mr. Ordaz is a 64 yo male with a PMHx of HTN, hypothyroidism, and DM II, who presented to the ED with c/o fatigue x 1 week.  Associated nausea and decreased appetite/PO intake.  Denies any CP, palpitations, SOB, orthopnea, PND, cough, edema, ABD pain, vomiting, diarrhea, dysuria, hematuria, lightheadedness/dizziness, syncope, AMS, focal deficits, or fever.  She reports being diagnosed with influenza 1 week ago, and has declined each day since.  Upon arrival to ED, patient notably hypotensive with BP 78/51, .  Patient received IVF bolus with good BP response.  Initial work-up in ED resulted BUN 30, cr. 2.2, glucose 284, lactic 2.7.  UA consistent with UTI.  CXR unremarkable.  Hospital Medicine was consulted for admission.  Currently, patient appears comfortable in NAD.  Patient remains hemodynamically stable.

## 2018-02-02 NOTE — PLAN OF CARE
Problem: Patient Care Overview  Goal: Plan of Care Review  Outcome: Ongoing (interventions implemented as appropriate)  Respirations even and unlabored, no distress noted on assessment, bed alarm, scd's, productive cough, no pain nausea or shortness of breath

## 2018-02-02 NOTE — ASSESSMENT & PLAN NOTE
- Likely secondary to UTI.  - BP in ED 78/51.  Received IVF bolus in ED with good BP response.  - Remains hemodynamically stable, no shock.  - Lactic 2.7, cr. 2.2.  - Afebrile, WBC normal.  - Blood and urine cultures pending.  - IV fluid resuscitation initiated in ED.  - Continuous IVF's.  - Empiric abx with IV Rocephin.  - Follow serial labs.

## 2018-02-03 VITALS
BODY MASS INDEX: 25.2 KG/M2 | SYSTOLIC BLOOD PRESSURE: 139 MMHG | TEMPERATURE: 98 F | HEART RATE: 91 BPM | DIASTOLIC BLOOD PRESSURE: 75 MMHG | HEIGHT: 68 IN | RESPIRATION RATE: 20 BRPM | OXYGEN SATURATION: 96 % | WEIGHT: 166.25 LBS

## 2018-02-03 PROBLEM — N17.9 AKI (ACUTE KIDNEY INJURY): Status: RESOLVED | Noted: 2018-02-02 | Resolved: 2018-02-03

## 2018-02-03 PROBLEM — R53.1 WEAKNESS: Status: RESOLVED | Noted: 2018-02-03 | Resolved: 2018-02-03

## 2018-02-03 PROBLEM — A41.9 SEVERE SEPSIS: Status: RESOLVED | Noted: 2018-02-01 | Resolved: 2018-02-03

## 2018-02-03 PROBLEM — R65.20 SEVERE SEPSIS: Status: RESOLVED | Noted: 2018-02-01 | Resolved: 2018-02-03

## 2018-02-03 PROBLEM — N30.01 ACUTE CYSTITIS WITH HEMATURIA: Status: RESOLVED | Noted: 2018-02-02 | Resolved: 2018-02-03

## 2018-02-03 PROBLEM — R53.1 WEAKNESS: Status: ACTIVE | Noted: 2018-02-03

## 2018-02-03 LAB
ALBUMIN SERPL BCP-MCNC: 3.2 G/DL
ALP SERPL-CCNC: 66 U/L
ALT SERPL W/O P-5'-P-CCNC: 26 U/L
ANION GAP SERPL CALC-SCNC: 9 MMOL/L
AST SERPL-CCNC: 25 U/L
BASOPHILS # BLD AUTO: 0.02 K/UL
BASOPHILS NFR BLD: 0.2 %
BILIRUB SERPL-MCNC: 0.3 MG/DL
BUN SERPL-MCNC: 15 MG/DL
CALCIUM SERPL-MCNC: 9.7 MG/DL
CHLORIDE SERPL-SCNC: 105 MMOL/L
CO2 SERPL-SCNC: 29 MMOL/L
CREAT SERPL-MCNC: 1.1 MG/DL
DIFFERENTIAL METHOD: ABNORMAL
EOSINOPHIL # BLD AUTO: 0.3 K/UL
EOSINOPHIL NFR BLD: 3.2 %
ERYTHROCYTE [DISTWIDTH] IN BLOOD BY AUTOMATED COUNT: 13.8 %
EST. GFR  (AFRICAN AMERICAN): >60 ML/MIN/1.73 M^2
EST. GFR  (NON AFRICAN AMERICAN): >60 ML/MIN/1.73 M^2
GLUCOSE SERPL-MCNC: 118 MG/DL
HCT VFR BLD AUTO: 35.3 %
HGB BLD-MCNC: 11.9 G/DL
LYMPHOCYTES # BLD AUTO: 2.3 K/UL
LYMPHOCYTES NFR BLD: 27.9 %
MAGNESIUM SERPL-MCNC: 1.9 MG/DL
MCH RBC QN AUTO: 32.2 PG
MCHC RBC AUTO-ENTMCNC: 33.7 G/DL
MCV RBC AUTO: 96 FL
MONOCYTES # BLD AUTO: 1 K/UL
MONOCYTES NFR BLD: 11.7 %
NEUTROPHILS # BLD AUTO: 4.7 K/UL
NEUTROPHILS NFR BLD: 57 %
PHOSPHATE SERPL-MCNC: 3.7 MG/DL
PLATELET # BLD AUTO: 321 K/UL
PMV BLD AUTO: 10.4 FL
POCT GLUCOSE: 112 MG/DL (ref 70–110)
POCT GLUCOSE: 209 MG/DL (ref 70–110)
POCT GLUCOSE: 250 MG/DL (ref 70–110)
POTASSIUM SERPL-SCNC: 5.3 MMOL/L
PROT SERPL-MCNC: 7.2 G/DL
RBC # BLD AUTO: 3.69 M/UL
SODIUM SERPL-SCNC: 143 MMOL/L
WBC # BLD AUTO: 8.22 K/UL

## 2018-02-03 PROCEDURE — 80053 COMPREHEN METABOLIC PANEL: CPT

## 2018-02-03 PROCEDURE — 90662 IIV NO PRSV INCREASED AG IM: CPT | Performed by: HOSPITALIST

## 2018-02-03 PROCEDURE — 90471 IMMUNIZATION ADMIN: CPT | Performed by: HOSPITALIST

## 2018-02-03 PROCEDURE — 83735 ASSAY OF MAGNESIUM: CPT

## 2018-02-03 PROCEDURE — G0009 ADMIN PNEUMOCOCCAL VACCINE: HCPCS | Performed by: HOSPITALIST

## 2018-02-03 PROCEDURE — G0008 ADMIN INFLUENZA VIRUS VAC: HCPCS | Performed by: HOSPITALIST

## 2018-02-03 PROCEDURE — 94761 N-INVAS EAR/PLS OXIMETRY MLT: CPT

## 2018-02-03 PROCEDURE — 90670 PCV13 VACCINE IM: CPT | Performed by: HOSPITALIST

## 2018-02-03 PROCEDURE — 85025 COMPLETE CBC W/AUTO DIFF WBC: CPT

## 2018-02-03 PROCEDURE — 63600175 PHARM REV CODE 636 W HCPCS: Performed by: HOSPITALIST

## 2018-02-03 PROCEDURE — 25000003 PHARM REV CODE 250: Performed by: EMERGENCY MEDICINE

## 2018-02-03 PROCEDURE — 25000003 PHARM REV CODE 250: Performed by: HOSPITALIST

## 2018-02-03 PROCEDURE — 25000003 PHARM REV CODE 250: Performed by: NURSE PRACTITIONER

## 2018-02-03 PROCEDURE — 84100 ASSAY OF PHOSPHORUS: CPT

## 2018-02-03 PROCEDURE — 63600175 PHARM REV CODE 636 W HCPCS: Performed by: FAMILY MEDICINE

## 2018-02-03 PROCEDURE — 90472 IMMUNIZATION ADMIN EACH ADD: CPT | Performed by: HOSPITALIST

## 2018-02-03 PROCEDURE — 63600175 PHARM REV CODE 636 W HCPCS: Performed by: NURSE PRACTITIONER

## 2018-02-03 PROCEDURE — 36415 COLL VENOUS BLD VENIPUNCTURE: CPT

## 2018-02-03 RX ORDER — CIPROFLOXACIN 500 MG/1
500 TABLET ORAL EVERY 12 HOURS
Qty: 10 TABLET | Refills: 0 | Status: SHIPPED | OUTPATIENT
Start: 2018-02-03 | End: 2023-01-21

## 2018-02-03 RX ORDER — LEVOTHYROXINE SODIUM 75 UG/1
75 TABLET ORAL
Status: DISCONTINUED | OUTPATIENT
Start: 2018-02-04 | End: 2018-02-03 | Stop reason: HOSPADM

## 2018-02-03 RX ORDER — LEVOTHYROXINE SODIUM 75 UG/1
75 TABLET ORAL
Qty: 30 TABLET | Refills: 1 | Status: SHIPPED | OUTPATIENT
Start: 2018-02-04 | End: 2019-02-04

## 2018-02-03 RX ADMIN — LOSARTAN POTASSIUM 50 MG: 50 TABLET, FILM COATED ORAL at 08:02

## 2018-02-03 RX ADMIN — VENLAFAXINE HYDROCHLORIDE 75 MG: 75 CAPSULE, EXTENDED RELEASE ORAL at 08:02

## 2018-02-03 RX ADMIN — INSULIN ASPART 2 UNITS: 100 INJECTION, SOLUTION INTRAVENOUS; SUBCUTANEOUS at 11:02

## 2018-02-03 RX ADMIN — HYDRALAZINE HYDROCHLORIDE 10 MG: 20 INJECTION INTRAMUSCULAR; INTRAVENOUS at 04:02

## 2018-02-03 RX ADMIN — INFLUENZA A VIRUSA/MICHIGAN/45/2015 X-275 (H1N1) ANTIGEN (FORMALDEHYDE INACTIVATED), INFLUENZA A VIRUS A/HONG KONG/4801/2014 X-263B (H3N2) ANTIGEN (FORMALDEHYDE INACTIVATED), AND INFLUENZA B VIRUS B/BRISBANE/60/2008 ANTIGEN (FORMALDEHYDE INACTIVATED) 0.5 ML: 60; 60; 60 INJECTION, SUSPENSION INTRAMUSCULAR at 07:02

## 2018-02-03 RX ADMIN — LEVOTHYROXINE SODIUM 50 MCG: 50 TABLET ORAL at 06:02

## 2018-02-03 RX ADMIN — PNEUMOCOCCAL 13-VALENT CONJUGATE VACCINE 0.5 ML: 2.2; 2.2; 2.2; 2.2; 2.2; 4.4; 2.2; 2.2; 2.2; 2.2; 2.2; 2.2; 2.2 INJECTION, SUSPENSION INTRAMUSCULAR at 07:02

## 2018-02-03 RX ADMIN — GUAIFENESIN AND DEXTROMETHORPHAN HYDROBROMIDE 1 TABLET: 600; 30 TABLET, EXTENDED RELEASE ORAL at 08:02

## 2018-02-03 RX ADMIN — SOLIFENACIN SUCCINATE 5 MG: 5 TABLET, FILM COATED ORAL at 08:02

## 2018-02-03 RX ADMIN — GABAPENTIN 300 MG: 300 CAPSULE ORAL at 08:02

## 2018-02-03 RX ADMIN — SODIUM CHLORIDE: 0.9 INJECTION, SOLUTION INTRAVENOUS at 08:02

## 2018-02-03 RX ADMIN — SODIUM CHLORIDE: 0.9 INJECTION, SOLUTION INTRAVENOUS at 12:02

## 2018-02-03 RX ADMIN — INSULIN ASPART 2 UNITS: 100 INJECTION, SOLUTION INTRAVENOUS; SUBCUTANEOUS at 06:02

## 2018-02-03 RX ADMIN — SODIUM CHLORIDE: 0.9 INJECTION, SOLUTION INTRAVENOUS at 05:02

## 2018-02-03 RX ADMIN — ENOXAPARIN SODIUM 40 MG: 100 INJECTION SUBCUTANEOUS at 05:02

## 2018-02-03 RX ADMIN — ASPIRIN 81 MG: 81 TABLET, COATED ORAL at 05:02

## 2018-02-03 RX ADMIN — PANTOPRAZOLE SODIUM 40 MG: 40 TABLET, DELAYED RELEASE ORAL at 08:02

## 2018-02-03 RX ADMIN — DILTIAZEM HYDROCHLORIDE 180 MG: 180 CAPSULE, COATED, EXTENDED RELEASE ORAL at 08:02

## 2018-02-03 RX ADMIN — ATORVASTATIN CALCIUM 10 MG: 10 TABLET, FILM COATED ORAL at 08:02

## 2018-02-03 NOTE — PLAN OF CARE
Problem: Pain, Acute (Adult)  Intervention: Monitor/Manage Analgesia  Pt c/o headache this afternoon, relieved by Tylenol.

## 2018-02-03 NOTE — PLAN OF CARE
Problem: Nausea/Vomiting (Adult)  Intervention: Minimize Nausea Triggers/Manage Symptoms  Pt c/o nausea after breakfast this morning relieved by Zofran. Inquired of pt and family if there was anything else he would like ordered for nausea that he takes at home. Family stated that he continued to have nausea, but pt denied having any nausea.  Pt slept most of the after noon, did not eat lunch. At some of his breakfast and dinner.

## 2018-02-03 NOTE — PLAN OF CARE
Problem: Patient Care Overview  Goal: Plan of Care Review  Outcome: Ongoing (interventions implemented as appropriate)  Respirations even and unlabored, no distress noted on assessment, pain, intermittent nausea, no shortness of breath, left knee immobilizer, refused scd's and bed alarm

## 2018-02-04 NOTE — PROGRESS NOTES
[facility] Hospital Medicine  Progress Note    Patient Name: Sailaja Ordaz  MRN: [id number]  Patient Class: IP- Inpatient   Admission Date: 2/1/2018  Length of Stay: 2 days  Attending Physician: Shelby Sanchez MD  Primary Care Provider: Clark Lu MD        Subjective:     Principal Problem:Severe sepsis    HPI:  Mr. Ordaz is a 64 yo male with a PMHx of HTN, hypothyroidism, and DM II, who presented to the ED with c/o fatigue x 1 week.  Associated nausea and decreased appetite/PO intake.  Denies any CP, palpitations, SOB, orthopnea, PND, cough, edema, ABD pain, vomiting, diarrhea, dysuria, hematuria, lightheadedness/dizziness, syncope, AMS, focal deficits, or fever.  She reports being diagnosed with influenza 1 week ago, and has declined each day since.  Upon arrival to ED, patient notably hypotensive with BP 78/51, .  Patient received IVF bolus with good BP response.  Initial work-up in ED resulted BUN 30, cr. 2.2, glucose 284, lactic 2.7.  UA consistent with UTI.  CXR unremarkable.  Hospital Medicine was consulted for admission.  Currently, patient appears comfortable in NAD.  Patient remains hemodynamically stable.    Hospital Course:  Mr. Ordaz is a 64yo male with a PMHx of HTN, hypothyroidism, and DM II, admitted for severe sepsis from UTI. Sepsis protocol including IV hydration initiated in the ED. Most likely secondary to UTI. Patient being treated with IV hydration and IV Rocephin. Patient c/o left knee pain and swelling. Left knee xray showed Questionable avulsion fracture medial femoral condyle in the region of the medial collateral ligament insertion. Knee immobilizer placed. Blood cx show NGTD. Urine cx growing a small GNR. Pt feels much better now, no new fever chills, no urinary Sx, no fever or chills, eating drinking well, walking around in the room well. Blood Cx remain NGTD.    Interval History: looks and feels lot better, no fever or chills, no urinary sx. Urine cx  gowning a small colony of GNR, still on IV rocephin, eating drinking well..     Review of Systems   Constitutional: Positive for appetite change and fatigue. Negative for activity change, chills, diaphoresis, fever and unexpected weight change.   HENT: Negative for congestion, postnasal drip, rhinorrhea, sinus pressure, sore throat and trouble swallowing.    Eyes: Negative for photophobia and visual disturbance.   Respiratory: Negative for apnea, cough, chest tightness, shortness of breath and wheezing.    Cardiovascular: Negative for chest pain, palpitations and leg swelling.   Gastrointestinal: Negative for abdominal distention, abdominal pain, blood in stool, constipation, diarrhea, nausea and vomiting.   Endocrine: Negative for polydipsia, polyphagia and polyuria.   Genitourinary: Negative for decreased urine volume, difficulty urinating, dysuria, flank pain, frequency, hematuria and urgency.   Musculoskeletal: Negative for arthralgias, back pain, gait problem, joint swelling and myalgias.        +left knee pain and swelling    Skin: Negative for pallor, rash and wound.   Neurological: Negative for dizziness, seizures, syncope, facial asymmetry, speech difficulty, weakness, light-headedness, numbness and headaches.   Psychiatric/Behavioral: Negative for confusion. The patient is not nervous/anxious.    All other systems reviewed and are negative.    Objective:     Vital Signs (Most Recent):  Temp: 98.1 °F (36.7 °C) (02/03/18 1603)  Pulse: 91 (02/03/18 1603)  Resp: 20 (02/03/18 1603)  BP: 139/75 (02/03/18 1603)  SpO2: 96 % (02/03/18 1603) Vital Signs (24h Range):  Temp:  [97.9 °F (36.6 °C)-98.8 °F (37.1 °C)] 98.1 °F (36.7 °C)  Pulse:  [83-97] 91  Resp:  [18-20] 20  SpO2:  [95 %-99 %] 96 %  BP: (139-171)/(67-80) 139/75     Weight: 75.4 kg (166 lb 3.6 oz)  Body mass index is 25.27 kg/m².    Intake/Output Summary (Last 24 hours) at 02/03/18 1801  Last data filed at 02/03/18 1500   Gross per 24 hour   Intake            2462.5 ml   Output             2075 ml   Net            387.5 ml      Physical Exam   Constitutional: He is oriented to person, place, and time. He appears well-developed and well-nourished. No distress.   HENT:   Head: Normocephalic and atraumatic.   Eyes: Conjunctivae are normal.   Neck: Normal range of motion. Neck supple. No JVD present.   Cardiovascular: Normal rate, regular rhythm, S1 normal, S2 normal and intact distal pulses.   No extrasystoles are present. Exam reveals no gallop.    No murmur heard.  Pulses:       Radial pulses are 2+ on the right side, and 2+ on the left side.        Dorsalis pedis pulses are 2+ on the right side, and 2+ on the left side.        Posterior tibial pulses are 2+ on the right side, and 2+ on the left side.   Pulmonary/Chest: Effort normal and breath sounds normal. No accessory muscle usage. No tachypnea. No respiratory distress. He has no decreased breath sounds. He has no wheezes. He has no rhonchi. He has no rales.   Abdominal: Soft. Bowel sounds are normal. He exhibits no distension. There is no tenderness. There is no rebound, no guarding and no CVA tenderness.   Musculoskeletal: Normal range of motion. He exhibits no edema, tenderness or deformity.        Left knee: He exhibits swelling. He exhibits no deformity and no erythema.   LLE: Neurovascularly intact   Neurological: He is alert and oriented to person, place, and time. No cranial nerve deficit or sensory deficit.   Skin: Skin is warm, dry and intact. Capillary refill takes less than 2 seconds. No rash noted. He is not diaphoretic. No cyanosis or erythema. No pallor.   Psychiatric: He has a normal mood and affect. His speech is normal and behavior is normal. Cognition and memory are normal.   Nursing note and vitals reviewed.      Significant Labs:   Blood Culture:   Recent Labs  Lab 02/01/18  1915   LABBLOO No Growth to date  No Growth to date  No Growth to date  No Growth to date     BMP:   Recent Labs  Lab  02/03/18  0453   *      K 5.3*      CO2 29   BUN 15   CREATININE 1.1   CALCIUM 9.7   MG 1.9     CBC:   Recent Labs  Lab 02/01/18  1916 02/02/18  0439 02/03/18  0453   WBC 12.09 8.17 8.22   HGB 13.0* 11.8* 11.9*   HCT 39.1* 32.6* 35.3*    294 321     Urine Culture:   Recent Labs  Lab 02/01/18  1900   LABURIN GRAM NEGATIVE MATILDE, NON-LACTOSE [id number],000 - 49,999 cfu/mlIdentification and susceptibility pending     All pertinent labs within the past 24 hours have been reviewed.    Significant Imaging: I have reviewed all pertinent imaging results/findings within the past 24 hours.    Assessment/Plan:      * Severe sepsis    - Likely secondary to UTI.  - BP in ED 78/51.  Received IVF bolus in ED with good BP response.  - Remains hemodynamically stable, no shock.  - Lactic 2.7, cr. 2.2.  - Afebrile, WBC normal.  - Blood and urine cultures pending.  - IV fluid resuscitation initiated in ED.  - Continuous IVF's.  - Empiric abx with IV Rocephin.  - Lactic normal   -Blood cx show NGTD  -Urine cx pending      2/3- appears resolving, has had 4 days of IV abx  Urine Cx only a small colony of GNR-- appears treated  Will d/c soon        Acute cystitis with hematuria    - Urine culture pending.  - IV Rocephin     Improving, as above        JOSIAH (acute kidney injury)    - Likely due to IVVD.  - IV hydration.  - Strict I&O's.  - Hold home ARB + metformin.  - Improved   resolved        Type 2 diabetes mellitus, uncontrolled    - Accuchecks with SSI.  - Continue home Levemir 30 units daily.  - Check HbA1c pending -- 7.2    Well controlled        Essential hypertension    - Hypotensive in ED.  Hemodynamically stable currently.  -  Monitor BP trends and resume as needed.  -BP meds resumed     BP doing well          VTE Risk Mitigation         Ordered     enoxaparin injection 40 mg  Daily     Route:  Subcutaneous        02/02/18 0827     Medium Risk of VTE  Once      02/01/18 4898     Place sequential  compression device  Until discontinued      02/01/18 3627              Shelby Sanchez MD  Department of Hospital Medicine   [facility]

## 2018-02-04 NOTE — DISCHARGE SUMMARY
[facility] Hospital Medicine  Discharge Summary      Patient Name: Sailaja Ordaz  MRN: [id number]  Admission Date: 2/1/2018  Hospital Length of Stay: 2 days  Discharge Date and Time:  02/03/2018 6:40 PM  Attending Physician: Shelby Sanchez MD   Discharging Provider: Shelby Sanchez MD  Primary Care Provider: Clark Lu MD      HPI:   Mr. Ordaz is a 64 yo male with a PMHx of HTN, hypothyroidism, and DM II, who presented to the ED with c/o fatigue x 1 week.  Associated nausea and decreased appetite/PO intake.  Denies any CP, palpitations, SOB, orthopnea, PND, cough, edema, ABD pain, vomiting, diarrhea, dysuria, hematuria, lightheadedness/dizziness, syncope, AMS, focal deficits, or fever.  She reports being diagnosed with influenza 1 week ago, and has declined each day since.  Upon arrival to ED, patient notably hypotensive with BP 78/51, .  Patient received IVF bolus with good BP response.  Initial work-up in ED resulted BUN 30, cr. 2.2, glucose 284, lactic 2.7.  UA consistent with UTI.  CXR unremarkable.  Hospital Medicine was consulted for admission.  Currently, patient appears comfortable in NAD.  Patient remains hemodynamically stable.    * No surgery found *      Hospital Course:   Mr. Ordaz is a 64yo male with a PMHx of HTN, hypothyroidism, and DM II, admitted for severe sepsis from UTI. Sepsis protocol including IV hydration initiated in the ED. Most likely secondary to UTI. Patient being treated with IV hydration and IV Rocephin. Patient c/o left knee pain and swelling. Left knee xray showed Questionable avulsion fracture medial femoral condyle in the region of the medial collateral ligament insertion. Knee immobilizer placed. Blood cx show NGTD. Urine cx growing a small GNR. Pt feels much better now, no new fever chills, no urinary Sx, no fever or chills, eating drinking well, walking around in the [place]. Blood Cx remain NGTD. He wishes to be discharged home now. He was seen  and examined today and deemed stable for discharge.      Consults:     No new Assessment & Plan notes have been filed under this hospital service since the last note was generated.  Service: Hospital Medicine    Final Active Diagnoses:    Diagnosis Date Noted POA    Type 2 diabetes mellitus, uncontrolled [E11.65] 10/02/2013 Yes     Chronic    Essential hypertension [I10] 02/03/2016 Yes     Chronic      Problems Resolved During this Admission:    Diagnosis Date Noted Date Resolved POA    PRINCIPAL PROBLEM:  Severe sepsis [A41.9, R65.20] 02/01/2018 02/03/2018 Yes    Acute cystitis with hematuria [N30.01] 02/02/2018 02/03/2018 Yes    JOSIAH (acute kidney injury) [N17.9] 02/02/2018 02/03/2018 Yes    Weakness [R53.1] 02/03/2018 02/03/2018 Yes       Discharged Condition: stable    Disposition: Home or Self Care    Follow Up:  Follow-up Information     Clark Lu MD. Schedule an appointment as soon as possible for a visit in 3 days.    Specialty:  Internal Medicine  Why:  Hospital follow up  Contact information:  [address] [facility] [address] [phone]                 Patient Instructions:     Diet diabetic     Activity as tolerated         Significant Diagnostic Studies: Labs:   BMP:   Recent Labs  Lab 02/01/18 1916 02/02/18  0439 02/03/18  0453   * 262* 118*   * 141 143   K 4.1 4.6 5.3*   CL 94* 103 105   CO2 24 29 29   BUN 30* 24* 15   CREATININE 2.2* 1.4 1.1   CALCIUM 9.7 9.0 9.7   MG 1.9 1.8 1.9   , CMP   Recent Labs  Lab 02/01/18 1916 02/02/18  0439 02/03/18  0453   * 141 143   K 4.1 4.6 5.3*   CL 94* 103 105   CO2 24 29 29   * 262* 118*   BUN 30* 24* 15   CREATININE 2.2* 1.4 1.1   CALCIUM 9.7 9.0 9.7   PROT 8.0 7.0 7.2   ALBUMIN 3.4* 3.0* 3.2*   BILITOT 0.5 0.2 0.3   ALKPHOS 77 69 66   AST 14 13 25   ALT 21 17 26   ANIONGAP 16 9 9   ESTGFRAFRICA 35.0* >60 >60   EGFRNONAA 30.3* 52* >60   , CBC   Recent Labs  Lab 02/01/18  1916 02/02/18  0439 02/03/18  0453   WBC  12.09 8.17 8.22   HGB 13.0* 11.8* 11.9*   HCT 39.1* 32.6* 35.3*    294 321   , A1C:   Recent Labs  Lab 02/02/18  0439   HGBA1C 7.2*    and All labs within the past 24 hours have been reviewed  Microbiology:   Blood Culture   Lab Results   Component Value Date    LABBLOO No Growth to date 02/01/2018    LABBLOO No Growth to date 02/01/2018    LABBLOO No Growth to date 02/01/2018    LABBLOO No Growth to date 02/01/2018    and Urine Culture    Lab Results   Component Value Date    LABURIN  02/01/2018     GRAM NEGATIVE MATILDE, NON-LACTOSE   10,000 - 49,999 cfu/ml  Identification and susceptibility pending       Radiology:  Imaging Results          X-Ray Knee 1 or 2 View Left (Final result)  Result time 02/02/18 13:49:22    Final result by Mary Watkins Jr., MD (02/02/18 13:49:22)                 Impression:      Questionable avulsion fracture medial femoral condyle in the region of the medial collateral ligament insertion.  Correlate with pain at the site.       Electronically signed by: MARY WATKINS  Date:     02/02/18  Time:    13:49              Narrative:    Exam: 2 views left knee.    Clinical Indication: Pain and swelling    Findings: Triangular shaped bony density adjacent to the medial femoral condyle may represent an avulsion fracture.  Correlate with pain at this site.  No evidence of a joint effusion.  Narrowing of the patellofemoral joint space.  Vascular calcifications throughout the leg.                             X-Ray Chest AP Portable (Final result)  Result time 02/01/18 19:29:01    Final result by Raymond Crawley MD (02/01/18 19:29:01)                 Impression:         Negative single view chest x-ray.      Electronically signed by: RAYMOND CRAWLEY MD  Date:     02/01/18  Time:    19:29              Narrative:    Procedure: XR CHEST AP PORTABLE, 02/01/18 19:19:56    Clinical indication:  Cough and fever.  Possible sepsis.    Comparison: None    Findings: Heart size is  normal. The lung fields are clear. No acute cardiopulmonary infiltrate.                            Pending Diagnostic Studies:     None         Medications:  Reconciled Home Medications:   Current Discharge Medication List      CONTINUE these medications which have CHANGED    Details   levothyroxine (SYNTHROID) 75 MCG tablet Take 1 tablet (75 mcg total) by mouth before breakfast.  Qty: 30 tablet, Refills: 1         CONTINUE these medications which have NOT CHANGED    Details   promethazine-dextromethorphan (PROMETHAZINE-DM) 6.25-15 mg/5 mL Syrp Take 5 mLs by mouth every 6 (six) hours as needed.      aspirin (ECOTRIN) 81 MG EC tablet Take 81 mg by mouth Daily.      atorvastatin (LIPITOR) 10 MG tablet Take 10 mg by mouth once daily.      calcipotriene-betamethasone (TACLONEX) external suspension Apply topically once daily.      cinnamon bark (CINNAMON) 500 mg capsule Take 1,000 mg by mouth once daily.      clobetasol (TEMOVATE) 0.05 % external solution Apply topically 2 (two) times daily.      diltiaZEM (CARDIZEM CD) 180 MG 24 hr capsule Take 180 mg by mouth 2 (two) times daily.      etanercept (ENBREL) 25 mg/0.5mL (0.51) Syrg injection Inject 25 mg into the skin once a week.      fish oil-omega-3 fatty acids 300-1,000 mg capsule Take 1 capsule by mouth once daily.      FOLIC ACID/MULTIVIT-MIN/LUTEIN (CENTRUM SILVER ORAL) Take by mouth Daily.      gabapentin (NEURONTIN) 300 MG capsule Take 300 mg by mouth 2 (two) times daily.      glimepiride (AMARYL) 4 MG tablet Take 4 mg by mouth before breakfast.      insulin glargine (LANTUS) 100 unit/mL injection Inject 30 Units into the skin once daily.      losartan (COZAAR) 50 MG tablet Take 50 mg by mouth once daily.      metFORMIN (GLUCOPHAGE) 1000 MG tablet Take 1,000 mg by mouth 2 (two) times daily with meals.      solifenacin (VESICARE) 5 MG tablet Take 5 mg by mouth once daily.      venlafaxine (EFFEXOR-XR) 75 MG 24 hr capsule Take 75 mg by mouth once daily.         STOP  taking these medications       oseltamivir (TAMIFLU) 75 MG capsule Comments:   Reason for Stopping:               Indwelling Lines/Drains at time of discharge:   Lines/Drains/Airways          No matching active lines, drains, or airways          Time spent on the discharge of patient: 43 minutes  Patient was seen and examined on the date of discharge and determined to be suitable for discharge.         Shelby Sanchez MD  Department of Hospital Medicine  [facility]

## 2018-02-04 NOTE — ASSESSMENT & PLAN NOTE
- Likely due to IVVD.  - IV hydration.  - Strict I&O's.  - Hold home ARB + metformin.  - Improved   resolved

## 2018-02-04 NOTE — PLAN OF CARE
Problem: Patient Care Overview  Goal: Plan of Care Review  Outcome: Outcome(s) achieved Date Met: 02/03/18  Discharge instructions given, and patient verbalized understanding.  Patient remained free of falls, accidents, and trama during the day shift.  IV and cardiac monitor has been d/c.  Cardiac monitor returned to the cardiac monitoring room.  Patient discharged to a private vehicle via a wheel chair.

## 2018-02-04 NOTE — ASSESSMENT & PLAN NOTE
- Likely secondary to UTI.  - BP in ED 78/51.  Received IVF bolus in ED with good BP response.  - Remains hemodynamically stable, no shock.  - Lactic 2.7, cr. 2.2.  - Afebrile, WBC normal.  - Blood and urine cultures pending.  - IV fluid resuscitation initiated in ED.  - Continuous IVF's.  - Empiric abx with IV Rocephin.  - Lactic normal   -Blood cx show NGTD  -Urine cx pending      2/3- appears resolving, has had 4 days of IV abx  Urine Cx only a small colony of GNR-- appears treated  Will d/c soon

## 2018-02-04 NOTE — DISCHARGE INSTRUCTIONS
You need to have the blood flow checked in your legs after discharge, arrange through a primary care doc.     See a bone doc for knee pain, it is possible you have a small chipped bone, unclear how old the injury is

## 2018-02-04 NOTE — ASSESSMENT & PLAN NOTE
- Accuchecks with SSI.  - Continue home Levemir 30 units daily.  - Check HbA1c pending -- 7.2    Well controlled

## 2018-02-04 NOTE — ASSESSMENT & PLAN NOTE
- Hypotensive in ED.  Hemodynamically stable currently.  -  Monitor BP trends and resume as needed.  -BP meds resumed     BP doing well

## 2018-02-04 NOTE — SUBJECTIVE & OBJECTIVE
Interval History: looks and feels lot better, no fever or chills, no urinary sx. Urine cx gowning a small colony of GNR, still on IV rocephin, eating drinking well..     Review of Systems   Constitutional: Positive for appetite change and fatigue. Negative for activity change, chills, diaphoresis, fever and unexpected weight change.   HENT: Negative for congestion, postnasal drip, rhinorrhea, sinus pressure, sore throat and trouble swallowing.    Eyes: Negative for photophobia and visual disturbance.   Respiratory: Negative for apnea, cough, chest tightness, shortness of breath and wheezing.    Cardiovascular: Negative for chest pain, palpitations and leg swelling.   Gastrointestinal: Negative for abdominal distention, abdominal pain, blood in stool, constipation, diarrhea, nausea and vomiting.   Endocrine: Negative for polydipsia, polyphagia and polyuria.   Genitourinary: Negative for decreased urine volume, difficulty urinating, dysuria, flank pain, frequency, hematuria and urgency.   Musculoskeletal: Negative for arthralgias, back pain, gait problem, joint swelling and myalgias.        +left knee pain and swelling    Skin: Negative for pallor, rash and wound.   Neurological: Negative for dizziness, seizures, syncope, facial asymmetry, speech difficulty, weakness, light-headedness, numbness and headaches.   Psychiatric/Behavioral: Negative for confusion. The patient is not nervous/anxious.    All other systems reviewed and are negative.    Objective:     Vital Signs (Most Recent):  Temp: 98.1 °F (36.7 °C) (02/03/18 1603)  Pulse: 91 (02/03/18 1603)  Resp: 20 (02/03/18 1603)  BP: 139/75 (02/03/18 1603)  SpO2: 96 % (02/03/18 1603) Vital Signs (24h Range):  Temp:  [97.9 °F (36.6 °C)-98.8 °F (37.1 °C)] 98.1 °F (36.7 °C)  Pulse:  [83-97] 91  Resp:  [18-20] 20  SpO2:  [95 %-99 %] 96 %  BP: (139-171)/(67-80) 139/75     Weight: 75.4 kg (166 lb 3.6 oz)  Body mass index is 25.27 kg/m².    Intake/Output Summary (Last 24  hours) at 02/03/18 1801  Last data filed at 02/03/18 1500   Gross per 24 hour   Intake           2462.5 ml   Output             2075 ml   Net            387.5 ml      Physical Exam   Constitutional: He is oriented to person, place, and time. He appears well-developed and well-nourished. No distress.   HENT:   Head: Normocephalic and atraumatic.   Eyes: Conjunctivae are normal.   Neck: Normal range of motion. Neck supple. No JVD present.   Cardiovascular: Normal rate, regular rhythm, S1 normal, S2 normal and intact distal pulses.   No extrasystoles are present. Exam reveals no gallop.    No murmur heard.  Pulses:       Radial pulses are 2+ on the right side, and 2+ on the left side.        Dorsalis pedis pulses are 2+ on the right side, and 2+ on the left side.        Posterior tibial pulses are 2+ on the right side, and 2+ on the left side.   Pulmonary/Chest: Effort normal and breath sounds normal. No accessory muscle usage. No tachypnea. No respiratory distress. He has no decreased breath sounds. He has no wheezes. He has no rhonchi. He has no rales.   Abdominal: Soft. Bowel sounds are normal. He exhibits no distension. There is no tenderness. There is no rebound, no guarding and no CVA tenderness.   Musculoskeletal: Normal range of motion. He exhibits no edema, tenderness or deformity.        Left knee: He exhibits swelling. He exhibits no deformity and no erythema.   LLE: Neurovascularly intact   Neurological: He is alert and oriented to person, place, and time. No cranial nerve deficit or sensory deficit.   Skin: Skin is warm, dry and intact. Capillary refill takes less than 2 seconds. No rash noted. He is not diaphoretic. No cyanosis or erythema. No pallor.   Psychiatric: He has a normal mood and affect. His speech is normal and behavior is normal. Cognition and memory are normal.   Nursing note and vitals reviewed.      Significant Labs:   Blood Culture:   Recent Labs  Lab 02/01/18  1915   LABBLOO No Growth to  date  No Growth to date  No Growth to date  No Growth to date     BMP:   Recent Labs  Lab 02/03/18  0453   *      K 5.3*      CO2 29   BUN 15   CREATININE 1.1   CALCIUM 9.7   MG 1.9     CBC:   Recent Labs  Lab 02/01/18  1916 02/02/18  0439 02/03/18  0453   WBC 12.09 8.17 8.22   HGB 13.0* 11.8* 11.9*   HCT 39.1* 32.6* 35.3*    294 321     Urine Culture:   Recent Labs  Lab 02/01/18  1900   LABURIN GRAM NEGATIVE MATILDE, NON-LACTOSE UOLDHDNFZ95,000 - 49,999 cfu/mlIdentification and susceptibility pending     All pertinent labs within the past 24 hours have been reviewed.    Significant Imaging: I have reviewed all pertinent imaging results/findings within the past 24 hours.

## 2018-02-05 LAB — BACTERIA UR CULT: NORMAL

## 2018-02-05 NOTE — PHYSICIAN QUERY
"PT Name: Sailaja Ordaz  MR #: [id number]    Physician Query Form - Cause and Effect Relationship Clarification      CDS: Irais Ventura RN, CCDS         Contact information :[phone]  [email or web address]       This form is a permanent document in the medical record.     Query Date: February 5, 2018    By submitting this query, we are merely seeking further clarification of documentation. Please utilize your independent clinical judgment when addressing the question(s) below.    The Medical record contains the following:  Supporting Clinical Findings   Location in record                                                                      "admitted for severe sepsis from UTI. Sepsis protocol including IV hydration initiated in the ED. Most likely secondary to UTI."   "Patient being treated with IV hydration and IV Rocephin"   "Urine cx growing a small GNR"                           Urine culture        [facility]                                    Discharge summary 2/3/18              Lab resulted 2/5/18             Doctor, please clarify if there is any correlation between sepsis and [facility] KOSERI .           Are the conditions:     [ xx ] Due to or associated with each other     [  ] Unrelated to each other     [  ] Other (Please Specify): _________________________     [  ] Clinically Undetermined                                                                                                                                                                                              "

## 2018-02-06 ENCOUNTER — PATIENT OUTREACH (OUTPATIENT)
Dept: ADMINISTRATIVE | Facility: CLINIC | Age: 63
End: 2018-02-06

## 2018-02-06 NOTE — PATIENT INSTRUCTIONS
Understanding Sepsis  Sepsis is a severe response the body has to an infection. It is most often caused by bacteria. It is also known as septicemia, or systemic inflammatory response syndrome (SIRS). Sepsis is a medical emergency. It needs to be treated right away.  What is sepsis?  Sepsis is when the body reacts to an infection with a severe inflammatory response. It can be caused by bacteria, fungus, or a virus. Sepsis can cause many kinds of problems around the body. It can lead severe low blood pressure (shock) and organ failure. This can lead to death if not treated.  Sepsis is most common in:  · Infants and older adults  · People with an infection such as pneumonia, meningitis, or a urinary tract infection  · People who have an illness such as cancer, AIDS, or diabetes  · People being treated with chemotherapy medications or radiation  · People who have had a transplant  Symptoms of sepsis  Symptoms of sepsis can include:  · Chills and shaking  · Rapid heartbeat  · Rapid breathing  · Shortness of breath  · Severe nausea or uncontrolled vomiting  · Confusion  · Dizziness  · Decreased urination  · Severe pain, including in the back or joints   Diagnosing sepsis  If your health care provider thinks you may have sepsis, you will be given tests. You may have blood and urine tests. These are done to look for bacteria, viruses, or fungus. You may also have X-rays or other imaging tests. These may be done to look at your organs to locate the source of infection.  Treating sepsis  If you have sepsis, your health care provider will give you antibiotics through a thin, flexible tube put into a vein in your arm (IV). You will also be given fluids through the IV. You may also be given nutrition or other medications through your IV. Your health care provider will talk with you about other treatments you may need. These may include using an oxygen mask or a ventilator to help with breathing. Treatment may last at least 7  to 10 days. Sepsis must be treated in the hospital.  Date Last Reviewed: 7/15/2015  © 7388-2279 [employer]. [address] [place] [address], [place] [address]. All rights reserved. This information is not intended as a substitute for professional medical care. Always follow your healthcare professional's instructions.

## 2018-02-07 LAB
BACTERIA BLD CULT: NORMAL
BACTERIA BLD CULT: NORMAL

## 2021-12-31 ENCOUNTER — HOSPITAL ENCOUNTER (EMERGENCY)
Facility: HOSPITAL | Age: 66
Discharge: HOME | End: 2021-12-31
Attending: EMERGENCY MEDICINE
Payer: MEDICARE

## 2021-12-31 VITALS
WEIGHT: 163.5 LBS | BODY MASS INDEX: 24.78 KG/M2 | DIASTOLIC BLOOD PRESSURE: 67 MMHG | HEIGHT: 68 IN | TEMPERATURE: 98 F | OXYGEN SATURATION: 99 % | SYSTOLIC BLOOD PRESSURE: 148 MMHG | HEART RATE: 92 BPM | RESPIRATION RATE: 10 BRPM

## 2021-12-31 DIAGNOSIS — I95.9 HYPOTENSION, UNSPECIFIED HYPOTENSION TYPE: Primary | ICD-10-CM

## 2021-12-31 DIAGNOSIS — I95.1 ORTHOSTATIC HYPOTENSION: ICD-10-CM

## 2021-12-31 DIAGNOSIS — T50.905A MEDICATION ADVERSE EFFECT, INITIAL ENCOUNTER: ICD-10-CM

## 2021-12-31 DIAGNOSIS — N18.9 CHRONIC RENAL IMPAIRMENT, UNSPECIFIED CKD STAGE: ICD-10-CM

## 2021-12-31 DIAGNOSIS — E86.1 INTRAVASCULAR VOLUME DEPLETION: ICD-10-CM

## 2021-12-31 LAB
ALBUMIN SERPL BCP-MCNC: 3.4 G/DL (ref 3.5–5.2)
ALBUMIN SERPL BCP-MCNC: 3.6 G/DL (ref 3.5–5.2)
ALP SERPL-CCNC: 94 U/L (ref 55–135)
ALP SERPL-CCNC: 96 U/L (ref 55–135)
ALT SERPL W/O P-5'-P-CCNC: 43 U/L (ref 10–44)
ALT SERPL W/O P-5'-P-CCNC: 45 U/L (ref 10–44)
ANION GAP SERPL CALC-SCNC: 14 MMOL/L (ref 8–16)
ANION GAP SERPL CALC-SCNC: 14 MMOL/L (ref 8–16)
AST SERPL-CCNC: 30 U/L (ref 10–40)
AST SERPL-CCNC: 33 U/L (ref 10–40)
B-OH-BUTYR BLD STRIP-SCNC: 0.1 MMOL/L (ref 0–0.5)
BASOPHILS # BLD AUTO: 0.04 K/UL (ref 0–0.2)
BASOPHILS NFR BLD: 0.4 % (ref 0–1.9)
BILIRUB SERPL-MCNC: 0.3 MG/DL (ref 0.1–1)
BILIRUB SERPL-MCNC: 0.3 MG/DL (ref 0.1–1)
BUN SERPL-MCNC: 31 MG/DL (ref 8–23)
BUN SERPL-MCNC: 31 MG/DL (ref 8–23)
CALCIUM SERPL-MCNC: 9.5 MG/DL (ref 8.7–10.5)
CALCIUM SERPL-MCNC: 9.6 MG/DL (ref 8.7–10.5)
CHLORIDE SERPL-SCNC: 95 MMOL/L (ref 95–110)
CHLORIDE SERPL-SCNC: 98 MMOL/L (ref 95–110)
CK SERPL-CCNC: 47 U/L (ref 20–200)
CO2 SERPL-SCNC: 27 MMOL/L (ref 23–29)
CO2 SERPL-SCNC: 27 MMOL/L (ref 23–29)
CREAT SERPL-MCNC: 2.6 MG/DL (ref 0.5–1.4)
CREAT SERPL-MCNC: 3 MG/DL (ref 0.5–1.4)
DIFFERENTIAL METHOD: ABNORMAL
EOSINOPHIL # BLD AUTO: 0.2 K/UL (ref 0–0.5)
EOSINOPHIL NFR BLD: 2.1 % (ref 0–8)
ERYTHROCYTE [DISTWIDTH] IN BLOOD BY AUTOMATED COUNT: 13.7 % (ref 11.5–14.5)
EST. GFR  (AFRICAN AMERICAN): 23.6 ML/MIN/1.73 M^2
EST. GFR  (AFRICAN AMERICAN): 28 ML/MIN/1.73 M^2
EST. GFR  (NON AFRICAN AMERICAN): 20.4 ML/MIN/1.73 M^2
EST. GFR  (NON AFRICAN AMERICAN): 24.3 ML/MIN/1.73 M^2
GLUCOSE SERPL-MCNC: 270 MG/DL (ref 70–110)
GLUCOSE SERPL-MCNC: 397 MG/DL (ref 70–110)
HCT VFR BLD AUTO: 34.8 % (ref 40–54)
HGB BLD-MCNC: 11.6 G/DL (ref 14–18)
IMM GRANULOCYTES # BLD AUTO: 0.05 K/UL (ref 0–0.04)
IMM GRANULOCYTES NFR BLD AUTO: 0.5 % (ref 0–0.5)
LYMPHOCYTES # BLD AUTO: 1.5 K/UL (ref 1–4.8)
LYMPHOCYTES NFR BLD: 15.7 % (ref 18–48)
MCH RBC QN AUTO: 32.3 PG (ref 27–31)
MCHC RBC AUTO-ENTMCNC: 33.3 G/DL (ref 32–36)
MCV RBC AUTO: 97 FL (ref 82–98)
MONOCYTES # BLD AUTO: 0.6 K/UL (ref 0.3–1)
MONOCYTES NFR BLD: 6.9 % (ref 4–15)
NEUTROPHILS # BLD AUTO: 6.9 K/UL (ref 1.8–7.7)
NEUTROPHILS NFR BLD: 74.4 % (ref 38–73)
NRBC BLD-RTO: 0 /100 WBC
PLATELET # BLD AUTO: 279 K/UL (ref 150–450)
PMV BLD AUTO: 10.5 FL (ref 9.2–12.9)
POCT GLUCOSE: 462 MG/DL (ref 70–110)
POTASSIUM SERPL-SCNC: 3.8 MMOL/L (ref 3.5–5.1)
POTASSIUM SERPL-SCNC: 4.5 MMOL/L (ref 3.5–5.1)
PROT SERPL-MCNC: 7.1 G/DL (ref 6–8.4)
PROT SERPL-MCNC: 7.2 G/DL (ref 6–8.4)
RBC # BLD AUTO: 3.59 M/UL (ref 4.6–6.2)
SODIUM SERPL-SCNC: 136 MMOL/L (ref 136–145)
SODIUM SERPL-SCNC: 139 MMOL/L (ref 136–145)
WBC # BLD AUTO: 9.23 K/UL (ref 3.9–12.7)

## 2021-12-31 PROCEDURE — 82010 KETONE BODYS QUAN: CPT | Mod: ER | Performed by: EMERGENCY MEDICINE

## 2021-12-31 PROCEDURE — 96360 HYDRATION IV INFUSION INIT: CPT | Mod: ER

## 2021-12-31 PROCEDURE — 85025 COMPLETE CBC W/AUTO DIFF WBC: CPT | Mod: ER | Performed by: EMERGENCY MEDICINE

## 2021-12-31 PROCEDURE — 96361 HYDRATE IV INFUSION ADD-ON: CPT | Mod: ER

## 2021-12-31 PROCEDURE — 63600175 PHARM REV CODE 636 W HCPCS: Mod: ER | Performed by: EMERGENCY MEDICINE

## 2021-12-31 PROCEDURE — 82550 ASSAY OF CK (CPK): CPT | Mod: ER | Performed by: EMERGENCY MEDICINE

## 2021-12-31 PROCEDURE — 80053 COMPREHEN METABOLIC PANEL: CPT | Mod: ER | Performed by: EMERGENCY MEDICINE

## 2021-12-31 PROCEDURE — 99291 CRITICAL CARE FIRST HOUR: CPT | Mod: 25,ER

## 2021-12-31 PROCEDURE — 80053 COMPREHEN METABOLIC PANEL: CPT | Mod: 91,ER | Performed by: EMERGENCY MEDICINE

## 2021-12-31 RX ADMIN — SODIUM CHLORIDE, SODIUM LACTATE, POTASSIUM CHLORIDE, AND CALCIUM CHLORIDE 1000 ML: .6; .31; .03; .02 INJECTION, SOLUTION INTRAVENOUS at 06:12

## 2022-01-01 NOTE — ED PROVIDER NOTES
Encounter Date: 12/31/2021       History     Chief Complaint   Patient presents with    Hypotension     Pt had near syncopal episode when standing up. BP was 80/53 while standing. Pts cbg was 431 when checked at home. Pt is diabetic, took insulin last night. Pt is on medication for htn. Denies changes in eating or drinking     Sailaja Ordaz is a 66 y.o. male with PMH of long-standing DM who presents with sudden onset, moderate to severe near-syncopal episode upon going from sitting to standing this afternoon, worsened by being on Cardizem and beta blocker and not drinking much water.  Orthostatic hypotension on arrival.         Review of patient's allergies indicates:  No Known Allergies  Past Medical History:   Diagnosis Date    Cancer     Depression     Diabetes mellitus     Hyperlipidemia     Hypertension     Neuropathy     Psoriasis     Thyroid disease      Past Surgical History:   Procedure Laterality Date    PROSTATECTOMY       No family history on file.  Social History     Tobacco Use    Smoking status: Never Smoker    Smokeless tobacco: Never Used   Substance Use Topics    Alcohol use: No    Drug use: No     Review of Systems   Constitutional: Negative.  Negative for fever.   HENT: Negative.    Eyes: Negative.    Respiratory: Negative.  Negative for cough and shortness of breath.    Cardiovascular: Negative.  Negative for chest pain.   Gastrointestinal: Negative.    Endocrine: Negative.    Genitourinary: Negative.    Musculoskeletal: Negative.    Skin: Negative.    Allergic/Immunologic: Negative.    Neurological: Negative.    Hematological: Negative.    Psychiatric/Behavioral: Negative.    All other systems reviewed and are negative.      Physical Exam     Initial Vitals [12/31/21 1708]   BP Pulse Resp Temp SpO2   (!) 125/59 92 16 98.2 °F (36.8 °C) 96 %      MAP       --         Physical Exam    Nursing note and vitals reviewed.  Constitutional: He appears well-developed and well-nourished.  No distress.   HENT:   Head: Normocephalic and atraumatic.   Mucous membranes dry    Eyes: Conjunctivae and EOM are normal. Pupils are equal, round, and reactive to light.   Neck: Neck supple.   Cardiovascular: Normal rate, regular rhythm and intact distal pulses.   Murmur (loud aortic systolic) heard.  Pulmonary/Chest: Breath sounds normal. No respiratory distress.   Abdominal: Abdomen is soft. He exhibits no distension. There is no abdominal tenderness.   Musculoskeletal:         General: No edema. Normal range of motion.      Cervical back: Neck supple.     Neurological: He is alert and oriented to person, place, and time. He has normal strength.   Skin: Skin is warm and dry. Capillary refill takes less than 2 seconds.   Psychiatric: He has a normal mood and affect. His behavior is normal. Judgment and thought content normal.         ED Course   Critical Care    Date/Time: 12/31/2021 6:18 PM  Performed by: Pineda Torrez MD  Authorized by: Pineda Torrez MD   Direct patient critical care time: 15 minutes  Additional history critical care time: 5 minutes  Ordering / reviewing critical care time: 5 minutes  Documentation critical care time: 5 minutes  Consulting other physicians critical care time: 2 minutes  Total critical care time (exclusive of procedural time) : 32 minutes  Critical care time was exclusive of separately billable procedures and treating other patients and teaching time.  Critical care was necessary to treat or prevent imminent or life-threatening deterioration of the following conditions: dehydration (hypotension).  Critical care was time spent personally by me on the following activities: blood draw for specimens, development of treatment plan with patient or surrogate, interpretation of cardiac output measurements, evaluation of patient's response to treatment, examination of patient, obtaining history from patient or surrogate, ordering and review of laboratory studies, ordering and  performing treatments and interventions, ordering and review of radiographic studies, pulse oximetry, re-evaluation of patient's condition and review of old charts.        Labs Reviewed   CBC W/ AUTO DIFFERENTIAL - Abnormal; Notable for the following components:       Result Value    RBC 3.59 (*)     Hemoglobin 11.6 (*)     Hematocrit 34.8 (*)     MCH 32.3 (*)     Immature Grans (Abs) 0.05 (*)     Gran % 74.4 (*)     Lymph % 15.7 (*)     All other components within normal limits   COMPREHENSIVE METABOLIC PANEL          Imaging Results    None          Medications   lactated ringers bolus 1,000 mL (1,000 mLs Intravenous New Bag 12/31/21 1809)     Medical Decision Making:   Clinical Tests:   Lab Tests: Reviewed  Medical Tests: Reviewed  ED Management:  The patient maintained stable vital signs with actual increasing blood pressures to be marginally hypertensive.  No further hypotensive episodes.  It was noted that the patient did have a elevated BUN creatinine.  The patient did show me BUN and creatinine done in August 2021 with a BUN of 23 creatinine 1.8 indicating some mild renal insufficiency.  His GFR was also noted to be low at 39 on his previous labs in August.  This is consistent with the fact that he has had a slight worsening of his BUN and creatinine and is advised to follow-up with his primary care provider to re-evaluate this degree of renal insufficiency.  His CPK and acetone were negative and his glucose had fallen to 270 and he was very stable for discharge indicating no symptoms at present.     Recent Results (from the past 24 hour(s))   CBC auto differential    Collection Time: 12/31/21  6:07 PM   Result Value Ref Range    WBC 9.23 3.90 - 12.70 K/uL    RBC 3.59 (L) 4.60 - 6.20 M/uL    Hemoglobin 11.6 (L) 14.0 - 18.0 g/dL    Hematocrit 34.8 (L) 40.0 - 54.0 %    MCV 97 82 - 98 fL    MCH 32.3 (H) 27.0 - 31.0 pg    MCHC 33.3 32.0 - 36.0 g/dL    RDW 13.7 11.5 - 14.5 %    Platelets 279 150 - 450 K/uL    MPV  10.5 9.2 - 12.9 fL    Immature Granulocytes 0.5 0.0 - 0.5 %    Gran # (ANC) 6.9 1.8 - 7.7 K/uL    Immature Grans (Abs) 0.05 (H) 0.00 - 0.04 K/uL    Lymph # 1.5 1.0 - 4.8 K/uL    Mono # 0.6 0.3 - 1.0 K/uL    Eos # 0.2 0.0 - 0.5 K/uL    Baso # 0.04 0.00 - 0.20 K/uL    nRBC 0 0 /100 WBC    Gran % 74.4 (H) 38.0 - 73.0 %    Lymph % 15.7 (L) 18.0 - 48.0 %    Mono % 6.9 4.0 - 15.0 %    Eosinophil % 2.1 0.0 - 8.0 %    Basophil % 0.4 0.0 - 1.9 %    Differential Method Automated        Medications   lactated ringers bolus 1,000 mL (1,000 mLs Intravenous New Bag 12/31/21 6424)                         Clinical Impression:   Final diagnoses:  [E86.1] Intravascular volume depletion  [I95.9] Hypotension, unspecified hypotension type (Primary)  [T50.124E] Medication adverse effect, initial encounter                 Bogdan Manuel MD  12/31/21 8698

## 2023-01-21 ENCOUNTER — HOSPITAL ENCOUNTER (EMERGENCY)
Facility: HOSPITAL | Age: 68
Discharge: HOME | End: 2023-01-21
Attending: EMERGENCY MEDICINE
Payer: MEDICARE

## 2023-01-21 VITALS
HEART RATE: 84 BPM | OXYGEN SATURATION: 97 % | SYSTOLIC BLOOD PRESSURE: 156 MMHG | RESPIRATION RATE: 21 BRPM | WEIGHT: 156.5 LBS | TEMPERATURE: 98 F | BODY MASS INDEX: 23.72 KG/M2 | DIASTOLIC BLOOD PRESSURE: 68 MMHG | HEIGHT: 68 IN

## 2023-01-21 DIAGNOSIS — Z79.4 TYPE 2 DIABETES MELLITUS WITH HYPERGLYCEMIA, WITH LONG-TERM CURRENT USE OF INSULIN: Primary | ICD-10-CM

## 2023-01-21 DIAGNOSIS — E11.65 TYPE 2 DIABETES MELLITUS WITH HYPERGLYCEMIA, WITH LONG-TERM CURRENT USE OF INSULIN: Primary | ICD-10-CM

## 2023-01-21 DIAGNOSIS — Z91.148 NONCOMPLIANCE WITH MEDICATIONS: ICD-10-CM

## 2023-01-21 DIAGNOSIS — N30.00 ACUTE CYSTITIS WITHOUT HEMATURIA: ICD-10-CM

## 2023-01-21 LAB
ALBUMIN SERPL BCP-MCNC: 3.8 G/DL (ref 3.5–5.2)
ALP SERPL-CCNC: 109 U/L (ref 55–135)
ALT SERPL W/O P-5'-P-CCNC: 56 U/L (ref 10–44)
ANION GAP SERPL CALC-SCNC: 15 MMOL/L (ref 8–16)
AST SERPL-CCNC: 50 U/L (ref 10–40)
B-OH-BUTYR BLD STRIP-SCNC: 0.2 MMOL/L (ref 0–0.5)
BACTERIA #/AREA URNS AUTO: ABNORMAL /HPF
BASOPHILS # BLD AUTO: 0.05 K/UL (ref 0–0.2)
BASOPHILS NFR BLD: 0.5 % (ref 0–1.9)
BILIRUB SERPL-MCNC: 0.3 MG/DL (ref 0.1–1)
BILIRUB UR QL STRIP: NEGATIVE
BUN SERPL-MCNC: 33 MG/DL (ref 8–23)
CALCIUM SERPL-MCNC: 9.5 MG/DL (ref 8.7–10.5)
CHLORIDE SERPL-SCNC: 95 MMOL/L (ref 95–110)
CLARITY UR REFRACT.AUTO: CLEAR
CO2 SERPL-SCNC: 25 MMOL/L (ref 23–29)
COLOR UR AUTO: YELLOW
CREAT SERPL-MCNC: 2.6 MG/DL (ref 0.5–1.4)
DIFFERENTIAL METHOD: ABNORMAL
EOSINOPHIL # BLD AUTO: 0.1 K/UL (ref 0–0.5)
EOSINOPHIL NFR BLD: 0.7 % (ref 0–8)
ERYTHROCYTE [DISTWIDTH] IN BLOOD BY AUTOMATED COUNT: 13.8 % (ref 11.5–14.5)
EST. GFR  (NO RACE VARIABLE): 25.9 ML/MIN/1.73 M^2
GLUCOSE SERPL-MCNC: 349 MG/DL (ref 70–110)
GLUCOSE UR QL STRIP: ABNORMAL
HCT VFR BLD AUTO: 37.8 % (ref 40–54)
HEP C VIRUS HOLD SPECIMEN: NORMAL
HGB BLD-MCNC: 12.6 G/DL (ref 14–18)
HGB UR QL STRIP: ABNORMAL
IMM GRANULOCYTES # BLD AUTO: 0.05 K/UL (ref 0–0.04)
IMM GRANULOCYTES NFR BLD AUTO: 0.5 % (ref 0–0.5)
KETONES UR QL STRIP: NEGATIVE
LEUKOCYTE ESTERASE UR QL STRIP: NEGATIVE
LYMPHOCYTES # BLD AUTO: 1.1 K/UL (ref 1–4.8)
LYMPHOCYTES NFR BLD: 10.4 % (ref 18–48)
MCH RBC QN AUTO: 32.9 PG (ref 27–31)
MCHC RBC AUTO-ENTMCNC: 33.3 G/DL (ref 32–36)
MCV RBC AUTO: 99 FL (ref 82–98)
MICROSCOPIC COMMENT: ABNORMAL
MONOCYTES # BLD AUTO: 0.7 K/UL (ref 0.3–1)
MONOCYTES NFR BLD: 6.2 % (ref 4–15)
NEUTROPHILS # BLD AUTO: 8.5 K/UL (ref 1.8–7.7)
NEUTROPHILS NFR BLD: 81.7 % (ref 38–73)
NITRITE UR QL STRIP: NEGATIVE
NRBC BLD-RTO: 0 /100 WBC
PH UR STRIP: 6 [PH] (ref 5–8)
PLATELET # BLD AUTO: 277 K/UL (ref 150–450)
PMV BLD AUTO: 10.2 FL (ref 9.2–12.9)
POCT GLUCOSE: 320 MG/DL (ref 70–110)
POCT GLUCOSE: 323 MG/DL (ref 70–110)
POCT GLUCOSE: 380 MG/DL (ref 70–110)
POTASSIUM SERPL-SCNC: 3.9 MMOL/L (ref 3.5–5.1)
PROT SERPL-MCNC: 7.6 G/DL (ref 6–8.4)
PROT UR QL STRIP: ABNORMAL
RBC # BLD AUTO: 3.83 M/UL (ref 4.6–6.2)
RBC #/AREA URNS AUTO: 1 /HPF (ref 0–4)
SODIUM SERPL-SCNC: 135 MMOL/L (ref 136–145)
SP GR UR STRIP: 1.01 (ref 1–1.03)
SQUAMOUS #/AREA URNS AUTO: 1 /HPF
URN SPEC COLLECT METH UR: ABNORMAL
UROBILINOGEN UR STRIP-ACNC: NEGATIVE EU/DL
WBC # BLD AUTO: 10.44 K/UL (ref 3.9–12.7)
WBC #/AREA URNS AUTO: 25 /HPF (ref 0–5)
WBC CLUMPS UR QL AUTO: ABNORMAL
YEAST UR QL AUTO: ABNORMAL

## 2023-01-21 PROCEDURE — 86803 HEPATITIS C AB TEST: CPT | Performed by: EMERGENCY MEDICINE

## 2023-01-21 PROCEDURE — 87077 CULTURE AEROBIC IDENTIFY: CPT | Performed by: EMERGENCY MEDICINE

## 2023-01-21 PROCEDURE — 87389 HIV-1 AG W/HIV-1&-2 AB AG IA: CPT | Performed by: EMERGENCY MEDICINE

## 2023-01-21 PROCEDURE — 25000003 PHARM REV CODE 250: Mod: ER | Performed by: EMERGENCY MEDICINE

## 2023-01-21 PROCEDURE — 85025 COMPLETE CBC W/AUTO DIFF WBC: CPT | Mod: ER | Performed by: EMERGENCY MEDICINE

## 2023-01-21 PROCEDURE — 96365 THER/PROPH/DIAG IV INF INIT: CPT | Mod: ER

## 2023-01-21 PROCEDURE — 80053 COMPREHEN METABOLIC PANEL: CPT | Mod: ER | Performed by: EMERGENCY MEDICINE

## 2023-01-21 PROCEDURE — 82962 GLUCOSE BLOOD TEST: CPT | Mod: 91,ER

## 2023-01-21 PROCEDURE — 99284 EMERGENCY DEPT VISIT MOD MDM: CPT | Mod: 25,ER

## 2023-01-21 PROCEDURE — 87186 SC STD MICRODIL/AGAR DIL: CPT | Performed by: EMERGENCY MEDICINE

## 2023-01-21 PROCEDURE — 63600175 PHARM REV CODE 636 W HCPCS: Mod: ER | Performed by: EMERGENCY MEDICINE

## 2023-01-21 PROCEDURE — 81000 URINALYSIS NONAUTO W/SCOPE: CPT | Mod: ER | Performed by: EMERGENCY MEDICINE

## 2023-01-21 PROCEDURE — 87086 URINE CULTURE/COLONY COUNT: CPT | Performed by: EMERGENCY MEDICINE

## 2023-01-21 PROCEDURE — 87088 URINE BACTERIA CULTURE: CPT | Performed by: EMERGENCY MEDICINE

## 2023-01-21 PROCEDURE — 96361 HYDRATE IV INFUSION ADD-ON: CPT | Mod: ER

## 2023-01-21 PROCEDURE — 82010 KETONE BODYS QUAN: CPT | Mod: ER | Performed by: EMERGENCY MEDICINE

## 2023-01-21 RX ORDER — FLUOROURACIL 50 MG/G
CREAM TOPICAL EVERY MORNING
COMMUNITY
Start: 2022-12-15

## 2023-01-21 RX ORDER — SEMAGLUTIDE 1.34 MG/ML
INJECTION, SOLUTION SUBCUTANEOUS
COMMUNITY
Start: 2022-12-15

## 2023-01-21 RX ORDER — HYDROCORTISONE 25 MG/G
CREAM TOPICAL
COMMUNITY
Start: 2023-01-18

## 2023-01-21 RX ORDER — ATORVASTATIN CALCIUM 40 MG/1
40 TABLET, FILM COATED ORAL
COMMUNITY
Start: 2022-12-12

## 2023-01-21 RX ORDER — TRIAMCINOLONE ACETONIDE 1 MG/G
CREAM TOPICAL
COMMUNITY
Start: 2023-01-18

## 2023-01-21 RX ORDER — CEPHALEXIN 500 MG/1
500 CAPSULE ORAL EVERY 12 HOURS
Qty: 10 CAPSULE | Refills: 0 | Status: SHIPPED | OUTPATIENT
Start: 2023-01-21 | End: 2023-01-26

## 2023-01-21 RX ORDER — PEN NEEDLE, DIABETIC 32GX 5/32"
NEEDLE, DISPOSABLE MISCELLANEOUS
COMMUNITY
Start: 2023-01-18

## 2023-01-21 RX ORDER — INSULIN GLARGINE 100 [IU]/ML
INJECTION, SOLUTION SUBCUTANEOUS
COMMUNITY
Start: 2023-01-18

## 2023-01-21 RX ORDER — MIRABEGRON 50 MG/1
1 TABLET, FILM COATED, EXTENDED RELEASE ORAL
COMMUNITY
Start: 2022-12-12

## 2023-01-21 RX ORDER — CILOSTAZOL 50 MG/1
50 TABLET ORAL 2 TIMES DAILY
COMMUNITY
Start: 2023-01-17

## 2023-01-21 RX ORDER — METOPROLOL TARTRATE 25 MG/1
25 TABLET, FILM COATED ORAL 2 TIMES DAILY
COMMUNITY
Start: 2023-01-17

## 2023-01-21 RX ORDER — ASPIRIN 81 MG/1
81 TABLET ORAL
COMMUNITY

## 2023-01-21 RX ORDER — MUPIROCIN 20 MG/G
OINTMENT TOPICAL NIGHTLY
COMMUNITY
Start: 2022-12-15

## 2023-01-21 RX ORDER — ACITRETIN 10 MG/1
10 CAPSULE ORAL
COMMUNITY
Start: 2022-12-12

## 2023-01-21 RX ADMIN — SODIUM CHLORIDE 1000 ML: 9 INJECTION, SOLUTION INTRAVENOUS at 08:01

## 2023-01-21 RX ADMIN — CEFTRIAXONE 1 G: 1 INJECTION, POWDER, FOR SOLUTION INTRAMUSCULAR; INTRAVENOUS at 09:01

## 2023-01-21 RX ADMIN — SODIUM CHLORIDE 1000 ML: 9 INJECTION, SOLUTION INTRAVENOUS at 09:01

## 2023-01-22 LAB
HCV AB SERPL QL IA: NEGATIVE
HIV 1+2 AB+HIV1 P24 AG SERPL QL IA: NEGATIVE

## 2023-01-22 NOTE — ED PROVIDER NOTES
ED Provider Note - 1/21/2023     History     Chief Complaint   Patient presents with    Hyperglycemia     Dizzy, high CBG reading at home, vomit once,  home reading of 337, hx of diabetes, missed weekly home med last week      Patient currently presents with concern regarding hyperglycemia.  Patient is a type 2 diabetic and notes that his blood sugar reading at home was in the 300s.  Patient describes that he did not receive his weekly Ozempic on Monday as scheduled.  Patient does continue to take 28 units of Lantus nightly.  Patient denies any significant change in his thirst or urine output.  Patient describes overall he is still feeling well.    Review of patient's allergies indicates:  No Known Allergies  Past Medical History:   Diagnosis Date    Cancer     Depression     Diabetes mellitus     Hyperlipidemia     Hypertension     Neuropathy     Psoriasis     Thyroid disease      Past Surgical History:   Procedure Laterality Date    PROSTATECTOMY       No family history on file.  Social History     Tobacco Use    Smoking status: Never    Smokeless tobacco: Never   Substance Use Topics    Alcohol use: No    Drug use: No     Review of Systems   Constitutional:  Negative for chills and fever.   HENT:  Negative for congestion and sore throat.    Respiratory:  Negative for chest tightness and shortness of breath.    Cardiovascular:  Negative for chest pain and palpitations.   Gastrointestinal:  Negative for abdominal pain and vomiting.   Genitourinary:  Negative for difficulty urinating and dysuria.   Skin:  Negative for color change and rash.   Neurological:  Negative for weakness and numbness.   All other systems reviewed and are negative.    Physical Exam     Initial Vitals [01/21/23 1935]   BP Pulse Resp Temp SpO2   (!) 124/58 80 20 97.7 °F (36.5 °C) 95 %      MAP       --         Vitals:    01/21/23 1935 01/21/23 2000 01/21/23 2201   BP: (!) 124/58  (!) 156/68   Pulse: 80 80 84   Resp: 20  (!) 21   Temp: 97.7 °F  "(36.5 °C)     TempSrc: Oral     SpO2: 95%  97%   Weight: 71 kg (156 lb 8.4 oz)     Height: 5' 8" (1.727 m)       Physical Exam    Constitutional: He appears well-developed and well-nourished. He is not diaphoretic. No distress.   HENT:   Head: Normocephalic and atraumatic.   Nose: Nose normal.   Mouth/Throat: Oropharynx is clear and moist.   Eyes: Conjunctivae are normal. No scleral icterus.   Neck: Neck supple. No JVD present.   Cardiovascular:  Normal rate, regular rhythm, normal heart sounds and intact distal pulses.           Pulmonary/Chest: Breath sounds normal. No respiratory distress.   Abdominal: Abdomen is soft. There is no abdominal tenderness.   Musculoskeletal:         General: No edema. Normal range of motion.      Cervical back: Neck supple.     Neurological: He is alert and oriented to person, place, and time.   Skin: Skin is warm and dry.       ED Course   Procedures                 MDM  History Acquisition   Additional history was not acquired from other historians.  The patient's list of active medical problems, social history, medications, and allergies as documented per RN staff has been reviewed.     Differential Diagnoses   Based on available information and the initial assessment, the working differential diagnoses considered during this evaluation include but are not limited to benign hyperglycemia, hyperosmotic nonketotic syndrome, diabetic ketoacidosis.      LABS     Labs Reviewed   CBC W/ AUTO DIFFERENTIAL - Abnormal; Notable for the following components:       Result Value    RBC 3.83 (*)     Hemoglobin 12.6 (*)     Hematocrit 37.8 (*)     MCV 99 (*)     MCH 32.9 (*)     Gran # (ANC) 8.5 (*)     Immature Grans (Abs) 0.05 (*)     Gran % 81.7 (*)     Lymph % 10.4 (*)     All other components within normal limits    Narrative:     Release to patient->Immediate   COMPREHENSIVE METABOLIC PANEL - Abnormal; Notable for the following components:    Sodium 135 (*)     Glucose 349 (*)     BUN 33 " (*)     Creatinine 2.6 (*)     AST 50 (*)     ALT 56 (*)     eGFR 25.9 (*)     All other components within normal limits    Narrative:     Release to patient->Immediate   URINALYSIS, REFLEX TO URINE CULTURE - Abnormal; Notable for the following components:    Protein, UA Trace (*)     Glucose, UA 3+ (*)     Occult Blood UA Trace (*)     All other components within normal limits    Narrative:     Specimen Source->Urine   URINALYSIS MICROSCOPIC - Abnormal; Notable for the following components:    WBC, UA 25 (*)     WBC Clumps, UA Few (*)     All other components within normal limits    Narrative:     Specimen Source->Urine   POCT GLUCOSE - Abnormal; Notable for the following components:    POCT Glucose 380 (*)     All other components within normal limits   POCT GLUCOSE - Abnormal; Notable for the following components:    POCT Glucose 323 (*)     All other components within normal limits   POCT GLUCOSE - Abnormal; Notable for the following components:    POCT Glucose 320 (*)     All other components within normal limits   CULTURE, URINE   BETA - HYDROXYBUTYRATE, SERUM   HIV 1 / 2 ANTIBODY   HEPATITIS C ANTIBODY   HEP C VIRUS HOLD SPECIMEN     Notable findings from our independent review of the labs ordered include  findings from the chemistry consistent with hyperglycemia as well chronic renal insufficiency and a very mild transaminitis.  Additionally the patient's urinalysis is notable for pyuria suggestive of UTI .  Beta hydroxybutyrate is unremarkable.  Anion gap is unremarkable.    Imaging     Imaging Results    None            EKG        Additional Consideration   Additional testing  was not considered during the course of this workup.  Comorbidities taken into consideration during the patient's evaluation and treatment include None.    Social determinants of health taken into consideration during development of our treatment plan include None.    Medications   sodium chloride 0.9% bolus 1,000 mL 1,000 mL (0 mLs  Intravenous Stopped 1/21/23 2110)   cefTRIAXone (ROCEPHIN) 1 g in dextrose 5 % in water (D5W) 5 % 50 mL IVPB (MB+) (0 g Intravenous Stopped 1/21/23 2146)   sodium chloride 0.9% bolus 1,000 mL 1,000 mL (0 mLs Intravenous Stopped 1/21/23 2146)                 ED Management/Discussion   All findings were reviewed with the patient/family in detail.  Patient's workup demonstrates no indication of hyperosmotic nonketotic syndrome or diabetic ketoacidosis.  Patient overall appears to be feeling well.  There is suggestion of cystitis and we will initiate a course of antibiotics pending PCP follow-up.  Patient has been encouraged to resume his Ozempic which is his plan for Monday.  Patient will continue his nightly Lantus and has been encouraged to maintain a strict diabetic diet.      I see no indication of an emergent process beyond that addressed during our encounter but have duly counseled the patient/family regarding the need for prompt follow-up as well as the indications that should prompt immediate return to the emergency room should new or worrisome developments occur.  The patient has additionally been provided with printed information regarding diagnosis as well as instructions regarding follow up and any medications intended to manage the patient's aforementioned conditions.  The patient/family communicates understanding of all this information and all remaining questions and concerns were addressed at this time.              CLINICAL IMPRESSION    ICD-10-CM ICD-9-CM   1. Type 2 diabetes mellitus with hyperglycemia, with long-term current use of insulin  E11.65 250.00    Z79.4 790.29     V58.67   2. Noncompliance with medications  Z91.14 V15.81   3. Acute cystitis without hematuria  N30.00 595.0          ED Disposition Condition    Discharge Stable               Russell Canales MD  01/22/23 4347

## 2023-01-23 LAB — BACTERIA UR CULT: ABNORMAL

## 2023-05-22 ENCOUNTER — PATIENT MESSAGE (OUTPATIENT)
Dept: RESEARCH | Facility: HOSPITAL | Age: 68
End: 2023-05-22
Payer: MEDICARE

## 2024-02-29 ENCOUNTER — HOSPITAL ENCOUNTER (EMERGENCY)
Facility: HOSPITAL | Age: 69
Discharge: HOME | End: 2024-02-29
Attending: EMERGENCY MEDICINE
Payer: MEDICARE

## 2024-02-29 VITALS
OXYGEN SATURATION: 98 % | DIASTOLIC BLOOD PRESSURE: 57 MMHG | HEART RATE: 101 BPM | HEIGHT: 68 IN | TEMPERATURE: 98 F | SYSTOLIC BLOOD PRESSURE: 148 MMHG | RESPIRATION RATE: 18 BRPM | WEIGHT: 146.38 LBS | BODY MASS INDEX: 22.19 KG/M2

## 2024-02-29 DIAGNOSIS — E86.0 DEHYDRATION: ICD-10-CM

## 2024-02-29 DIAGNOSIS — E11.65 HYPERGLYCEMIA DUE TO DIABETES MELLITUS: ICD-10-CM

## 2024-02-29 DIAGNOSIS — I10 HYPERTENSION, UNSPECIFIED TYPE: Primary | ICD-10-CM

## 2024-02-29 DIAGNOSIS — R41.0 CONFUSION: ICD-10-CM

## 2024-02-29 LAB
ALBUMIN SERPL BCP-MCNC: 3.6 G/DL (ref 3.5–5.2)
ALP SERPL-CCNC: 117 U/L (ref 55–135)
ALT SERPL W/O P-5'-P-CCNC: 23 U/L (ref 10–44)
ANION GAP SERPL CALC-SCNC: 16 MMOL/L (ref 8–16)
AST SERPL-CCNC: 21 U/L (ref 10–40)
B-OH-BUTYR BLD STRIP-SCNC: 0.8 MMOL/L (ref 0–0.5)
B-OH-BUTYR BLD STRIP-SCNC: 2.7 MMOL/L (ref 0–0.5)
BACTERIA #/AREA URNS AUTO: ABNORMAL /HPF
BASOPHILS # BLD AUTO: 0.04 K/UL (ref 0–0.2)
BASOPHILS NFR BLD: 0.6 % (ref 0–1.9)
BILIRUB SERPL-MCNC: 0.8 MG/DL (ref 0.1–1)
BILIRUB UR QL STRIP: NEGATIVE
BNP SERPL-MCNC: 22 PG/ML (ref 0–99)
BUN SERPL-MCNC: 28 MG/DL (ref 8–23)
CALCIUM SERPL-MCNC: 9.9 MG/DL (ref 8.7–10.5)
CHLORIDE SERPL-SCNC: 97 MMOL/L (ref 95–110)
CK SERPL-CCNC: 64 U/L (ref 20–200)
CLARITY UR REFRACT.AUTO: CLEAR
CO2 SERPL-SCNC: 23 MMOL/L (ref 23–29)
COLOR UR AUTO: YELLOW
CREAT SERPL-MCNC: 2.1 MG/DL (ref 0.5–1.4)
DIFFERENTIAL METHOD BLD: ABNORMAL
EOSINOPHIL # BLD AUTO: 0.1 K/UL (ref 0–0.5)
EOSINOPHIL NFR BLD: 1.6 % (ref 0–8)
ERYTHROCYTE [DISTWIDTH] IN BLOOD BY AUTOMATED COUNT: 13.2 % (ref 11.5–14.5)
EST. GFR  (NO RACE VARIABLE): 33 ML/MIN/1.73 M^2
GLUCOSE SERPL-MCNC: 317 MG/DL (ref 70–110)
GLUCOSE UR QL STRIP: ABNORMAL
HCT VFR BLD AUTO: 47.8 % (ref 40–54)
HGB BLD-MCNC: 16 G/DL (ref 14–18)
HGB UR QL STRIP: ABNORMAL
HYALINE CASTS UR QL AUTO: 2 /LPF
IMM GRANULOCYTES # BLD AUTO: 0.01 K/UL (ref 0–0.04)
IMM GRANULOCYTES NFR BLD AUTO: 0.2 % (ref 0–0.5)
KETONES UR QL STRIP: ABNORMAL
LEUKOCYTE ESTERASE UR QL STRIP: NEGATIVE
LYMPHOCYTES # BLD AUTO: 0.9 K/UL (ref 1–4.8)
LYMPHOCYTES NFR BLD: 14.4 % (ref 18–48)
MAGNESIUM SERPL-MCNC: 1.9 MG/DL (ref 1.6–2.6)
MCH RBC QN AUTO: 31.7 PG (ref 27–31)
MCHC RBC AUTO-ENTMCNC: 33.5 G/DL (ref 32–36)
MCV RBC AUTO: 95 FL (ref 82–98)
MICROSCOPIC COMMENT: ABNORMAL
MONOCYTES # BLD AUTO: 0.4 K/UL (ref 0.3–1)
MONOCYTES NFR BLD: 5.5 % (ref 4–15)
NEUTROPHILS # BLD AUTO: 5 K/UL (ref 1.8–7.7)
NEUTROPHILS NFR BLD: 77.7 % (ref 38–73)
NITRITE UR QL STRIP: NEGATIVE
NRBC BLD-RTO: 0 /100 WBC
PH UR STRIP: 6 [PH] (ref 5–8)
PLATELET # BLD AUTO: 193 K/UL (ref 150–450)
PMV BLD AUTO: 10.2 FL (ref 9.2–12.9)
POCT GLUCOSE: 250 MG/DL (ref 70–110)
POCT GLUCOSE: 274 MG/DL (ref 70–110)
POCT GLUCOSE: 285 MG/DL (ref 70–110)
POCT GLUCOSE: 304 MG/DL (ref 70–110)
POTASSIUM SERPL-SCNC: 4.4 MMOL/L (ref 3.5–5.1)
PROT SERPL-MCNC: 7.8 G/DL (ref 6–8.4)
PROT UR QL STRIP: NEGATIVE
RBC # BLD AUTO: 5.05 M/UL (ref 4.6–6.2)
RBC #/AREA URNS AUTO: 1 /HPF (ref 0–4)
SODIUM SERPL-SCNC: 136 MMOL/L (ref 136–145)
SP GR UR STRIP: 1.01 (ref 1–1.03)
SQUAMOUS #/AREA URNS AUTO: 1 /HPF
TROPONIN I SERPL DL<=0.01 NG/ML-MCNC: 0.03 NG/ML (ref 0–0.03)
TROPONIN I SERPL DL<=0.01 NG/ML-MCNC: 0.03 NG/ML (ref 0–0.03)
TSH SERPL DL<=0.005 MIU/L-ACNC: 1.53 UIU/ML (ref 0.4–4)
URN SPEC COLLECT METH UR: ABNORMAL
UROBILINOGEN UR STRIP-ACNC: NEGATIVE EU/DL
WBC # BLD AUTO: 6.38 K/UL (ref 3.9–12.7)
WBC #/AREA URNS AUTO: 1 /HPF (ref 0–5)
YEAST UR QL AUTO: ABNORMAL

## 2024-02-29 PROCEDURE — 82550 ASSAY OF CK (CPK): CPT | Mod: ER | Performed by: EMERGENCY MEDICINE

## 2024-02-29 PROCEDURE — 82010 KETONE BODYS QUAN: CPT | Mod: 91,ER | Performed by: EMERGENCY MEDICINE

## 2024-02-29 PROCEDURE — 81000 URINALYSIS NONAUTO W/SCOPE: CPT | Mod: ER | Performed by: EMERGENCY MEDICINE

## 2024-02-29 PROCEDURE — 96374 THER/PROPH/DIAG INJ IV PUSH: CPT | Mod: ER

## 2024-02-29 PROCEDURE — 83735 ASSAY OF MAGNESIUM: CPT | Mod: ER | Performed by: EMERGENCY MEDICINE

## 2024-02-29 PROCEDURE — 83880 ASSAY OF NATRIURETIC PEPTIDE: CPT | Mod: ER | Performed by: EMERGENCY MEDICINE

## 2024-02-29 PROCEDURE — 93005 ELECTROCARDIOGRAM TRACING: CPT | Mod: ER

## 2024-02-29 PROCEDURE — 82962 GLUCOSE BLOOD TEST: CPT | Mod: 91,ER

## 2024-02-29 PROCEDURE — 96361 HYDRATE IV INFUSION ADD-ON: CPT | Mod: ER

## 2024-02-29 PROCEDURE — 80053 COMPREHEN METABOLIC PANEL: CPT | Mod: ER | Performed by: EMERGENCY MEDICINE

## 2024-02-29 PROCEDURE — 96375 TX/PRO/DX INJ NEW DRUG ADDON: CPT | Mod: ER

## 2024-02-29 PROCEDURE — 82962 GLUCOSE BLOOD TEST: CPT | Mod: ER

## 2024-02-29 PROCEDURE — 93010 ELECTROCARDIOGRAM REPORT: CPT | Mod: ,,, | Performed by: INTERNAL MEDICINE

## 2024-02-29 PROCEDURE — 25000003 PHARM REV CODE 250: Mod: ER | Performed by: EMERGENCY MEDICINE

## 2024-02-29 PROCEDURE — 99285 EMERGENCY DEPT VISIT HI MDM: CPT | Mod: 25,ER

## 2024-02-29 PROCEDURE — 84443 ASSAY THYROID STIM HORMONE: CPT | Mod: ER | Performed by: EMERGENCY MEDICINE

## 2024-02-29 PROCEDURE — 85025 COMPLETE CBC W/AUTO DIFF WBC: CPT | Mod: ER | Performed by: EMERGENCY MEDICINE

## 2024-02-29 PROCEDURE — 84484 ASSAY OF TROPONIN QUANT: CPT | Mod: ER | Performed by: EMERGENCY MEDICINE

## 2024-02-29 PROCEDURE — 63600175 PHARM REV CODE 636 W HCPCS: Mod: ER | Performed by: EMERGENCY MEDICINE

## 2024-02-29 RX ORDER — METOPROLOL TARTRATE 25 MG/1
25 TABLET, FILM COATED ORAL
Status: COMPLETED | OUTPATIENT
Start: 2024-02-29 | End: 2024-02-29

## 2024-02-29 RX ORDER — CLONIDINE HYDROCHLORIDE 0.1 MG/1
0.1 TABLET ORAL
Status: COMPLETED | OUTPATIENT
Start: 2024-02-29 | End: 2024-02-29

## 2024-02-29 RX ORDER — LABETALOL HYDROCHLORIDE 5 MG/ML
20 INJECTION, SOLUTION INTRAVENOUS
Status: COMPLETED | OUTPATIENT
Start: 2024-02-29 | End: 2024-02-29

## 2024-02-29 RX ADMIN — INSULIN HUMAN 5 UNITS: 100 INJECTION, SOLUTION PARENTERAL at 07:02

## 2024-02-29 RX ADMIN — SODIUM CHLORIDE 1000 ML: 9 INJECTION, SOLUTION INTRAVENOUS at 06:02

## 2024-02-29 RX ADMIN — LABETALOL HYDROCHLORIDE 20 MG: 5 INJECTION INTRAVENOUS at 08:02

## 2024-02-29 RX ADMIN — SODIUM CHLORIDE 1000 ML: 9 INJECTION, SOLUTION INTRAVENOUS at 08:02

## 2024-02-29 RX ADMIN — CLONIDINE HYDROCHLORIDE 0.1 MG: 0.1 TABLET ORAL at 07:02

## 2024-02-29 RX ADMIN — METOPROLOL TARTRATE 25 MG: 25 TABLET, FILM COATED ORAL at 08:02

## 2024-03-01 NOTE — ED PROVIDER NOTES
"Encounter Date: 2/29/2024       History     Chief Complaint   Patient presents with    Altered Mental Status     "Cloudy" today per pts son. Diabetic with high blood sugar. pt feels like he has a lot going on. Has dexcom meter that has been reading HI for a long time. Supposed to take insulin but has not had any in 2-3 days     The history is provided by the patient.   Altered Mental Status  This is a new problem. The current episode started 2 days ago. The problem occurs rarely. The problem has been resolved. Pertinent negatives include no chest pain, no abdominal pain, no headaches and no shortness of breath. Nothing aggravates the symptoms.   Pt reports feeling cloudy and fatigued. Pt reports elevated BS. Pt denies focal weakness, change in speech, change in vision/ CP, SOB,fever.  Pt reports decreased po intake for several days.    Review of patient's allergies indicates:  No Known Allergies  Past Medical History:   Diagnosis Date    Cancer     Depression     Diabetes mellitus     Hyperlipidemia     Hypertension     Neuropathy     Psoriasis     Thyroid disease      Past Surgical History:   Procedure Laterality Date    PROSTATECTOMY       No family history on file.  Social History     Tobacco Use    Smoking status: Never    Smokeless tobacco: Never   Substance Use Topics    Alcohol use: No    Drug use: No     Review of Systems   Constitutional:  Positive for fatigue. Negative for fever.   HENT:  Negative for sore throat.    Respiratory:  Negative for shortness of breath.    Cardiovascular:  Negative for chest pain.   Gastrointestinal:  Negative for abdominal pain and nausea.   Genitourinary:  Negative for dysuria.   Musculoskeletal:  Negative for back pain.   Skin:  Negative for rash.   Neurological:  Negative for weakness and headaches.   Hematological:  Does not bruise/bleed easily.   Psychiatric/Behavioral:  Positive for confusion.        Physical Exam     Initial Vitals [02/29/24 1742]   BP Pulse Resp Temp " SpO2   (!) 225/88 (!) 115 18 98.3 °F (36.8 °C) 98 %      MAP       --         Physical Exam    Nursing note and vitals reviewed.  Constitutional: He appears well-developed and well-nourished.   HENT:   Head: Normocephalic and atraumatic.   Mouth/Throat: Oropharynx is clear and moist.   Eyes: Conjunctivae and EOM are normal. Pupils are equal, round, and reactive to light.   Neck: Neck supple.   Normal range of motion.  Cardiovascular:  Normal rate, regular rhythm and normal heart sounds.           Pulmonary/Chest: Breath sounds normal.   Abdominal: Abdomen is soft. Bowel sounds are normal.   Musculoskeletal:         General: Normal range of motion.      Cervical back: Normal range of motion and neck supple.     Neurological: He is alert and oriented to person, place, and time. He has normal strength.   Skin: Skin is warm and dry.   Psychiatric: He has a normal mood and affect. Thought content normal.         ED Course   Procedures  Labs Reviewed   CBC W/ AUTO DIFFERENTIAL - Abnormal; Notable for the following components:       Result Value    MCH 31.7 (*)     Lymph # 0.9 (*)     Gran % 77.7 (*)     Lymph % 14.4 (*)     All other components within normal limits    Narrative:     Release to patient->Immediate   COMPREHENSIVE METABOLIC PANEL - Abnormal; Notable for the following components:    Glucose 317 (*)     BUN 28 (*)     Creatinine 2.1 (*)     eGFR 33.0 (*)     All other components within normal limits    Narrative:     Release to patient->Immediate   TROPONIN I - Abnormal; Notable for the following components:    Troponin I 0.030 (*)     All other components within normal limits    Narrative:     Release to patient->Immediate   URINALYSIS, REFLEX TO URINE CULTURE - Abnormal; Notable for the following components:    Glucose, UA 3+ (*)     Ketones, UA 2+ (*)     Occult Blood UA Trace (*)     All other components within normal limits    Narrative:     Specimen Source->Urine   BETA - HYDROXYBUTYRATE, SERUM -  Abnormal; Notable for the following components:    Beta-Hydroxybutyrate 2.7 (*)     All other components within normal limits    Narrative:     Release to patient->Immediate   TROPONIN I - Abnormal; Notable for the following components:    Troponin I 0.034 (*)     All other components within normal limits   BETA - HYDROXYBUTYRATE, SERUM - Abnormal; Notable for the following components:    Beta-Hydroxybutyrate 0.8 (*)     All other components within normal limits   URINALYSIS MICROSCOPIC - Abnormal; Notable for the following components:    Hyaline Casts, UA 2 (*)     All other components within normal limits    Narrative:     Specimen Source->Urine   POCT GLUCOSE - Abnormal; Notable for the following components:    POCT Glucose 304 (*)     All other components within normal limits   POCT GLUCOSE - Abnormal; Notable for the following components:    POCT Glucose 285 (*)     All other components within normal limits   POCT GLUCOSE - Abnormal; Notable for the following components:    POCT Glucose 250 (*)     All other components within normal limits   B-TYPE NATRIURETIC PEPTIDE    Narrative:     Release to patient->Immediate   MAGNESIUM    Narrative:     Release to patient->Immediate   CK    Narrative:     Release to patient->Immediate   TSH   TSH    Narrative:     Release to patient->Immediate   POCT GLUCOSE MONITORING CONTINUOUS     Results for orders placed or performed during the hospital encounter of 02/29/24   CBC Auto Differential   Result Value Ref Range    WBC 6.38 3.90 - 12.70 K/uL    RBC 5.05 4.60 - 6.20 M/uL    Hemoglobin 16.0 14.0 - 18.0 g/dL    Hematocrit 47.8 40.0 - 54.0 %    MCV 95 82 - 98 fL    MCH 31.7 (H) 27.0 - 31.0 pg    MCHC 33.5 32.0 - 36.0 g/dL    RDW 13.2 11.5 - 14.5 %    Platelets 193 150 - 450 K/uL    MPV 10.2 9.2 - 12.9 fL    Immature Granulocytes 0.2 0.0 - 0.5 %    Gran # (ANC) 5.0 1.8 - 7.7 K/uL    Immature Grans (Abs) 0.01 0.00 - 0.04 K/uL    Lymph # 0.9 (L) 1.0 - 4.8 K/uL    Mono # 0.4 0.3  - 1.0 K/uL    Eos # 0.1 0.0 - 0.5 K/uL    Baso # 0.04 0.00 - 0.20 K/uL    nRBC 0 0 /100 WBC    Gran % 77.7 (H) 38.0 - 73.0 %    Lymph % 14.4 (L) 18.0 - 48.0 %    Mono % 5.5 4.0 - 15.0 %    Eosinophil % 1.6 0.0 - 8.0 %    Basophil % 0.6 0.0 - 1.9 %    Differential Method Automated    Comprehensive Metabolic Panel   Result Value Ref Range    Sodium 136 136 - 145 mmol/L    Potassium 4.4 3.5 - 5.1 mmol/L    Chloride 97 95 - 110 mmol/L    CO2 23 23 - 29 mmol/L    Glucose 317 (H) 70 - 110 mg/dL    BUN 28 (H) 8 - 23 mg/dL    Creatinine 2.1 (H) 0.5 - 1.4 mg/dL    Calcium 9.9 8.7 - 10.5 mg/dL    Total Protein 7.8 6.0 - 8.4 g/dL    Albumin 3.6 3.5 - 5.2 g/dL    Total Bilirubin 0.8 0.1 - 1.0 mg/dL    Alkaline Phosphatase 117 55 - 135 U/L    AST 21 10 - 40 U/L    ALT 23 10 - 44 U/L    eGFR 33.0 (A) >60 mL/min/1.73 m^2    Anion Gap 16 8 - 16 mmol/L   BNP   Result Value Ref Range    BNP 22 0 - 99 pg/mL   Troponin I   Result Value Ref Range    Troponin I 0.030 (H) 0.000 - 0.026 ng/mL   Urinalysis, Reflex to Urine Culture Urine, Clean Catch    Specimen: Urine   Result Value Ref Range    Specimen UA Urine, Clean Catch     Color, UA Yellow Yellow, Straw, [place]    Appearance, UA Clear Clear    pH, UA 6.0 5.0 - 8.0    Specific Gravity, UA 1.015 1.005 - 1.030    Protein, UA Negative Negative    Glucose, UA 3+ (A) Negative    Ketones, UA 2+ (A) Negative    Bilirubin (UA) Negative Negative    Occult Blood UA Trace (A) Negative    Nitrite, UA Negative Negative    Urobilinogen, UA Negative <2.0 EU/dL    Leukocytes, UA Negative Negative   Magnesium   Result Value Ref Range    Magnesium 1.9 1.6 - 2.6 mg/dL   CK   Result Value Ref Range    CPK 64 20 - 200 U/L   Beta - Hydroxybutyrate, Serum   Result Value Ref Range    Beta-Hydroxybutyrate 2.7 (H) 0.0 - 0.5 mmol/L   TSH   Result Value Ref Range    TSH 1.533 0.400 - 4.000 uIU/mL   Troponin I   Result Value Ref Range    Troponin I 0.034 (H) 0.000 - 0.026 ng/mL   Beta - Hydroxybutyrate, Serum    Result Value Ref Range    Beta-Hydroxybutyrate 0.8 (H) 0.0 - 0.5 mmol/L   Urinalysis Microscopic   Result Value Ref Range    RBC, UA 1 0 - 4 /hpf    WBC, UA 1 0 - 5 /hpf    Bacteria Rare None-Occ /hpf    Yeast, UA None None    Squam Epithel, UA 1 /hpf    Hyaline Casts, UA 2 (A) 0-1/lpf /lpf    Microscopic Comment SEE COMMENT    POCT glucose   Result Value Ref Range    POCT Glucose 304 (H) 70 - 110 mg/dL   POCT glucose   Result Value Ref Range    POCT Glucose 285 (H) 70 - 110 mg/dL   POCT glucose   Result Value Ref Range    POCT Glucose 250 (H) 70 - 110 mg/dL       EKG Readings: (Independently Interpreted)   Rhythm: Sinus Tachycardia. Heart Rate: 105. ST Segments: Normal ST Segments. T Waves: Normal. Axis: Normal. Clinical Impression: Sinus Tachycardia       Imaging Results              CT Head Without Contrast (Final result)  Result time 02/29/24 19:14:49      Final result by Tere Ashton MD (02/29/24 19:14:49)                   Impression:      No acute abnormality.      Electronically signed by: Tere Ashton  Date:    02/29/2024  Time:    19:14               Narrative:    EXAMINATION:  CT HEAD WITHOUT CONTRAST    CLINICAL HISTORY:  Mental status change, unknown cause; Disorientation, unspecified    TECHNIQUE:  Low dose axial CT images obtained throughout the head without intravenous contrast. Sagittal and coronal reconstructions were performed.    COMPARISON:  01/24/2018    FINDINGS:  Intracranial compartment:    Ventricles and sulci are normal in size for age without evidence of hydrocephalus. No extra-axial blood or fluid collections.    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  Small right thalamic lacunar infarct.  Background of mild-to-moderate chronic microvascular ischemia.  No parenchymal mass, hemorrhage, edema or major vascular distribution infarct.    Skull/extracranial contents (limited evaluation): No fracture. Mastoid air cells and paranasal sinuses are essentially clear.                                        X-Ray Chest 1 View (Final result)  Result time 02/29/24 19:41:32      Final result by Tere Ashton MD (02/29/24 19:41:32)                   Impression:      No acute findings.      Electronically signed by: Tere Ashton  Date:    02/29/2024  Time:    19:41               Narrative:    EXAMINATION:  XR CHEST 1 VIEW    CLINICAL HISTORY:  Disorientation, unspecified    TECHNIQUE:  Single frontal view of the chest was performed.    COMPARISON:  02/01/2018    FINDINGS:  Small nodular opacities projecting over each lower lung, likely nipple shadows.  If warranted, recommend repeat with nipple markers.    Lungs are clear. No focal consolidation. No pleural effusion. No pneumothorax. Normal heart size.  Sternotomy wires.                                  2145 pm Discussed elevated troponin, value 0.03-0.034- offered admission to  for observation and cardiac evaluation but pt feels back to baseline and prefers to follow up with PCP/Cardiology as outpt. Family member at bs, agrees c plan. Blood pressure improved, BS improved, IV fluids infused, pt tolerating po intake s difficulty.    9:59 PM - Counseling: Spoke with the patient and discussed todays findings, in addition to providing specific details for the plan of care and counseling regarding the diagnosis and prognosis. Questions are answered at this time.        Medications   sodium chloride 0.9% bolus 1,000 mL 1,000 mL (0 mLs Intravenous Stopped 2/29/24 2045)   cloNIDine tablet 0.1 mg (0.1 mg Oral Given 2/29/24 1957)   insulin regular injection 5 Units 0.05 mL (5 Units Intravenous Given 2/29/24 1958)   sodium chloride 0.9% bolus 1,000 mL 1,000 mL (1,000 mLs Intravenous New Bag 2/29/24 2055)   metoprolol tartrate (LOPRESSOR) tablet 25 mg (25 mg Oral Given 2/29/24 2043)   labetaloL injection 20 mg (20 mg Intravenous Given 2/29/24 2055)     Medical Decision Making  Problems Addressed:  Confusion: acute illness or injury      Details: Resolved, pt is not confused in ED, Alert oriented X 4  Dehydration: acute illness or injury  Hyperglycemia due to diabetes mellitus: chronic illness or injury with exacerbation, progression, or side effects of treatment  Hypertension, unspecified type: chronic illness or injury with exacerbation, progression, or side effects of treatment    Amount and/or Complexity of Data Reviewed  Labs: ordered.  Radiology: ordered.  ECG/medicine tests: ordered and independent interpretation performed. Decision-making details documented in ED Course.    Risk  OTC drugs.  Prescription drug management.  Decision regarding hospitalization.      Additional MDM:     NIH Stroke Scale:   Interval = baseline (upon arrival/admit)  Level of consciousness = 0 - alert  LOC questions = 0 - answers both correctly  LOC commands = 0 - performs both correctly  Best gaze = 0 - normal  Visual = 0 - no visual loss  Facial palsy = 0 - normal  Motor left arm =  0 - no drift  Motor right arm =  0 - no drift  Motor left leg = 0 - no drift  Motor right leg =  0 - no drift  Limb ataxia = 0 - absent  Sensory = 0 - normal  Best language = 0 - no aphasia  Dysarthria = 0 - normal articulation  Extinction and inattention = 0 - no neglect  NIH Stroke Scale Total = 0                                     Clinical Impression:  Final diagnoses:  [R41.0] Confusion  [E11.65] Hyperglycemia due to diabetes mellitus  [I10] Hypertension, unspecified type (Primary)  [E86.0] Dehydration          ED Disposition Condition    Discharge Stable          ED Prescriptions    None       Follow-up Information       Follow up With Specialties Details Why Contact Info    Clark Lu MD Internal Medicine Schedule an appointment as soon as possible for a visit in 2 days  [address] [place] [id number] [phone]      [facility] - Emergency Dept Emergency Medicine  If symptoms worsen [address] [address] [address] [phone]    PROV   CARDIOLOGY Cardiology Schedule an appointment as soon as possible for a visit in 2 days  [address] [facility] [address] [phone]             Marcelino Swann MD  02/29/24 2857

## 2024-03-03 LAB
OHS QRS DURATION: 82 MS
OHS QTC CALCULATION: 454 MS

## 2024-07-29 ENCOUNTER — HOSPITAL ENCOUNTER (INPATIENT)
Facility: HOSPITAL | Age: 69
LOS: 1 days | Discharge: LEFT BEFORE DISCHARGE | DRG: 312 | End: 2024-07-29
Attending: EMERGENCY MEDICINE | Admitting: INTERNAL MEDICINE
Payer: MEDICARE

## 2024-07-29 VITALS
DIASTOLIC BLOOD PRESSURE: 56 MMHG | TEMPERATURE: 98 F | RESPIRATION RATE: 12 BRPM | OXYGEN SATURATION: 99 % | HEART RATE: 86 BPM | SYSTOLIC BLOOD PRESSURE: 118 MMHG | WEIGHT: 146 LBS | HEIGHT: 68 IN | BODY MASS INDEX: 22.13 KG/M2

## 2024-07-29 DIAGNOSIS — R53.1 ASTHENIA: ICD-10-CM

## 2024-07-29 DIAGNOSIS — R55 NEAR SYNCOPE: ICD-10-CM

## 2024-07-29 DIAGNOSIS — I95.1 ORTHOSTATIC HYPOTENSION: Primary | ICD-10-CM

## 2024-07-29 LAB
ALBUMIN SERPL BCP-MCNC: 3.5 G/DL (ref 3.5–5.2)
ALP SERPL-CCNC: 93 U/L (ref 55–135)
ALT SERPL W/O P-5'-P-CCNC: 26 U/L (ref 10–44)
ANION GAP SERPL CALC-SCNC: 11 MMOL/L (ref 8–16)
AST SERPL-CCNC: 27 U/L (ref 10–40)
BASOPHILS # BLD AUTO: 0.06 K/UL (ref 0–0.2)
BASOPHILS NFR BLD: 0.6 % (ref 0–1.9)
BILIRUB SERPL-MCNC: 0.7 MG/DL (ref 0.1–1)
BILIRUB UR QL STRIP: NEGATIVE
BNP SERPL-MCNC: 76 PG/ML (ref 0–99)
BUN SERPL-MCNC: 27 MG/DL (ref 8–23)
CALCIUM SERPL-MCNC: 9.6 MG/DL (ref 8.7–10.5)
CHLORIDE SERPL-SCNC: 101 MMOL/L (ref 95–110)
CK SERPL-CCNC: 57 U/L (ref 20–200)
CLARITY UR REFRACT.AUTO: CLEAR
CO2 SERPL-SCNC: 25 MMOL/L (ref 23–29)
COLOR UR AUTO: YELLOW
CREAT SERPL-MCNC: 2.1 MG/DL (ref 0.5–1.4)
DIFFERENTIAL METHOD BLD: ABNORMAL
EOSINOPHIL # BLD AUTO: 0.2 K/UL (ref 0–0.5)
EOSINOPHIL NFR BLD: 1.8 % (ref 0–8)
ERYTHROCYTE [DISTWIDTH] IN BLOOD BY AUTOMATED COUNT: 14.2 % (ref 11.5–14.5)
EST. GFR  (NO RACE VARIABLE): 33 ML/MIN/1.73 M^2
GLUCOSE SERPL-MCNC: 183 MG/DL (ref 70–110)
GLUCOSE UR QL STRIP: ABNORMAL
HCT VFR BLD AUTO: 35.1 % (ref 40–54)
HGB BLD-MCNC: 11.8 G/DL (ref 14–18)
HGB UR QL STRIP: ABNORMAL
IMM GRANULOCYTES # BLD AUTO: 0.04 K/UL (ref 0–0.04)
IMM GRANULOCYTES NFR BLD AUTO: 0.4 % (ref 0–0.5)
KETONES UR QL STRIP: NEGATIVE
LEUKOCYTE ESTERASE UR QL STRIP: NEGATIVE
LYMPHOCYTES # BLD AUTO: 1.5 K/UL (ref 1–4.8)
LYMPHOCYTES NFR BLD: 16 % (ref 18–48)
MAGNESIUM SERPL-MCNC: 2.1 MG/DL (ref 1.6–2.6)
MCH RBC QN AUTO: 32.4 PG (ref 27–31)
MCHC RBC AUTO-ENTMCNC: 33.6 G/DL (ref 32–36)
MCV RBC AUTO: 96 FL (ref 82–98)
MICROSCOPIC COMMENT: NORMAL
MONOCYTES # BLD AUTO: 0.7 K/UL (ref 0.3–1)
MONOCYTES NFR BLD: 7.4 % (ref 4–15)
NEUTROPHILS # BLD AUTO: 6.9 K/UL (ref 1.8–7.7)
NEUTROPHILS NFR BLD: 73.8 % (ref 38–73)
NITRITE UR QL STRIP: NEGATIVE
NRBC BLD-RTO: 0 /100 WBC
OHS QRS DURATION: 96 MS
OHS QTC CALCULATION: 445 MS
PH UR STRIP: 6 [PH] (ref 5–8)
PLATELET # BLD AUTO: 236 K/UL (ref 150–450)
PMV BLD AUTO: 10.9 FL (ref 9.2–12.9)
POTASSIUM SERPL-SCNC: 4.4 MMOL/L (ref 3.5–5.1)
PROT SERPL-MCNC: 7 G/DL (ref 6–8.4)
PROT UR QL STRIP: NEGATIVE
RBC # BLD AUTO: 3.64 M/UL (ref 4.6–6.2)
RBC #/AREA URNS AUTO: 1 /HPF (ref 0–4)
SODIUM SERPL-SCNC: 137 MMOL/L (ref 136–145)
SP GR UR STRIP: <=1.005 (ref 1–1.03)
TROPONIN I SERPL DL<=0.01 NG/ML-MCNC: 0.01 NG/ML (ref 0–0.03)
URN SPEC COLLECT METH UR: ABNORMAL
UROBILINOGEN UR STRIP-ACNC: NEGATIVE EU/DL
WBC # BLD AUTO: 9.37 K/UL (ref 3.9–12.7)

## 2024-07-29 PROCEDURE — 25000003 PHARM REV CODE 250: Mod: ER | Performed by: EMERGENCY MEDICINE

## 2024-07-29 PROCEDURE — 93005 ELECTROCARDIOGRAM TRACING: CPT | Mod: ER

## 2024-07-29 PROCEDURE — 99285 EMERGENCY DEPT VISIT HI MDM: CPT | Mod: 25,ER

## 2024-07-29 PROCEDURE — 93010 ELECTROCARDIOGRAM REPORT: CPT | Mod: ,,, | Performed by: INTERNAL MEDICINE

## 2024-07-29 PROCEDURE — 96360 HYDRATION IV INFUSION INIT: CPT | Mod: ER

## 2024-07-29 PROCEDURE — 11000001 HC ACUTE MED/SURG PRIVATE ROOM: Mod: ER

## 2024-07-29 PROCEDURE — 83880 ASSAY OF NATRIURETIC PEPTIDE: CPT | Mod: ER | Performed by: EMERGENCY MEDICINE

## 2024-07-29 PROCEDURE — 80053 COMPREHEN METABOLIC PANEL: CPT | Mod: ER | Performed by: EMERGENCY MEDICINE

## 2024-07-29 PROCEDURE — 82550 ASSAY OF CK (CPK): CPT | Mod: ER | Performed by: EMERGENCY MEDICINE

## 2024-07-29 PROCEDURE — 96361 HYDRATE IV INFUSION ADD-ON: CPT | Mod: ER

## 2024-07-29 PROCEDURE — 83735 ASSAY OF MAGNESIUM: CPT | Mod: ER | Performed by: EMERGENCY MEDICINE

## 2024-07-29 PROCEDURE — 81000 URINALYSIS NONAUTO W/SCOPE: CPT | Mod: ER | Performed by: EMERGENCY MEDICINE

## 2024-07-29 PROCEDURE — 84484 ASSAY OF TROPONIN QUANT: CPT | Mod: ER | Performed by: EMERGENCY MEDICINE

## 2024-07-29 PROCEDURE — 85025 COMPLETE CBC W/AUTO DIFF WBC: CPT | Mod: ER | Performed by: EMERGENCY MEDICINE

## 2024-07-29 RX ORDER — SODIUM CHLORIDE 9 MG/ML
1000 INJECTION, SOLUTION INTRAVENOUS
Status: COMPLETED | OUTPATIENT
Start: 2024-07-29 | End: 2024-07-29

## 2024-07-29 RX ORDER — FERROUS SULFATE, DRIED 160(50) MG
1 TABLET, EXTENDED RELEASE ORAL 2 TIMES DAILY WITH MEALS
COMMUNITY

## 2024-07-29 RX ORDER — INSULIN ASPART 100 [IU]/ML
INJECTION, SUSPENSION SUBCUTANEOUS
COMMUNITY

## 2024-07-29 RX ADMIN — SODIUM CHLORIDE 1000 ML: 9 INJECTION, SOLUTION INTRAVENOUS at 06:07

## 2024-07-29 RX ADMIN — SODIUM CHLORIDE 1000 ML: 9 INJECTION, SOLUTION INTRAVENOUS at 02:07

## 2024-07-29 RX ADMIN — SODIUM CHLORIDE 1000 ML: 9 INJECTION, SOLUTION INTRAVENOUS at 04:07

## 2024-07-29 NOTE — ED PROVIDER NOTES
Encounter Date: 7/29/2024       History     Chief Complaint   Patient presents with    Weakness     Generalized weakness x2 days, fall today, no syncope, denies CP or SOB. No fever. AAOx4.      68 y/o M with PMH of DM, HTN, HLD, Neuropathy, Cancer here with c/o generalized weakness and a near syncopal episode. Patient has been feeling progressively weaker over the past 1 week. Today, he was unable to keep himself standing, and fell to the ground. He says he did not loose consciousness, but family member at bedside is unsure if this is true. She says she had to have help getting him into the car, and that he could not walk on his own. Patient says he thinks he is dehydrated, does not want to drink fluids. Patient denies any injury or other complaints at this time including chest pain, focal numbness or weakness, abdominal pain, headache, neck or back pain.     The history is provided by the patient and a relative.     Review of patient's allergies indicates:  No Known Allergies  Past Medical History:   Diagnosis Date    Cancer     Depression     Diabetes mellitus     Hyperlipidemia     Hypertension     Neuropathy     Psoriasis     Thyroid disease      Past Surgical History:   Procedure Laterality Date    PROSTATECTOMY       No family history on file.  Social History     Tobacco Use    Smoking status: Never    Smokeless tobacco: Never   Substance Use Topics    Alcohol use: No    Drug use: No     Review of Systems   Constitutional:  Negative for chills and fever.   HENT:  Negative for congestion and dental problem.    Eyes:  Negative for pain and visual disturbance.   Respiratory:  Negative for cough and shortness of breath.    Cardiovascular:  Negative for chest pain and palpitations.   Gastrointestinal:  Negative for abdominal pain, diarrhea, nausea and vomiting.   Genitourinary:  Negative for dysuria and flank pain.   Musculoskeletal:  Negative for back pain and neck pain.   Skin:  Negative for rash and wound.    Neurological:  Positive for syncope and weakness (Generalized). Negative for numbness and headaches.       Physical Exam     Initial Vitals [07/29/24 1327]   BP Pulse Resp Temp SpO2   127/74 90 18 98 °F (36.7 °C) 99 %      MAP       --         Physical Exam    Constitutional: He appears well-developed and well-nourished. No distress.   HENT:   Head: Normocephalic and atraumatic.   Dry MM   Eyes: EOM are normal. Pupils are equal, round, and reactive to light.   Neck: Neck supple.   Normal range of motion.  Cardiovascular:  Normal rate, regular rhythm and intact distal pulses.           Pulmonary/Chest: Breath sounds normal. No respiratory distress.   Abdominal: He exhibits no distension. There is no abdominal tenderness.   Musculoskeletal:         General: No tenderness. Normal range of motion.      Cervical back: Normal range of motion and neck supple.     Neurological: He is alert and oriented to person, place, and time. He has normal strength. No sensory deficit.   Skin: Skin is warm and dry.   Psychiatric: He has a normal mood and affect.         ED Course   Procedures  Labs Reviewed   CBC W/ AUTO DIFFERENTIAL - Abnormal       Result Value    WBC 9.37      RBC 3.64 (*)     Hemoglobin 11.8 (*)     Hematocrit 35.1 (*)     MCV 96      MCH 32.4 (*)     MCHC 33.6      RDW 14.2      Platelets 236      MPV 10.9      Immature Granulocytes 0.4      Gran # (ANC) 6.9      Immature Grans (Abs) 0.04      Lymph # 1.5      Mono # 0.7      Eos # 0.2      Baso # 0.06      nRBC 0      Gran % 73.8 (*)     Lymph % 16.0 (*)     Mono % 7.4      Eosinophil % 1.8      Basophil % 0.6      Differential Method Automated     COMPREHENSIVE METABOLIC PANEL - Abnormal    Sodium 137      Potassium 4.4      Chloride 101      CO2 25      Glucose 183 (*)     BUN 27 (*)     Creatinine 2.1 (*)     Calcium 9.6      Total Protein 7.0      Albumin 3.5      Total Bilirubin 0.7      Alkaline Phosphatase 93      AST 27      ALT 26      eGFR 33.0 (*)      Anion Gap 11     URINALYSIS, REFLEX TO URINE CULTURE - Abnormal    Specimen UA Urine, Clean Catch      Color, UA Yellow      Appearance, UA Clear      pH, UA 6.0      Specific Gravity, UA <=1.005 (*)     Protein, UA Negative      Glucose, UA 1+ (*)     Ketones, UA Negative      Bilirubin (UA) Negative      Occult Blood UA 1+ (*)     Nitrite, UA Negative      Urobilinogen, UA Negative      Leukocytes, UA Negative      Narrative:     Specimen Source->Urine   CK    CPK 57     B-TYPE NATRIURETIC PEPTIDE    BNP 76     TROPONIN I    Troponin I 0.010     MAGNESIUM    Magnesium 2.1     URINALYSIS MICROSCOPIC    RBC, UA 1      Microscopic Comment SEE COMMENT      Narrative:     Specimen Source->Urine     Results for orders placed or performed during the hospital encounter of 07/29/24   CPK   Result Value Ref Range    CPK 57 20 - 200 U/L   CBC auto differential   Result Value Ref Range    WBC 9.37 3.90 - 12.70 K/uL    RBC 3.64 (L) 4.60 - 6.20 M/uL    Hemoglobin 11.8 (L) 14.0 - 18.0 g/dL    Hematocrit 35.1 (L) 40.0 - 54.0 %    MCV 96 82 - 98 fL    MCH 32.4 (H) 27.0 - 31.0 pg    MCHC 33.6 32.0 - 36.0 g/dL    RDW 14.2 11.5 - 14.5 %    Platelets 236 150 - 450 K/uL    MPV 10.9 9.2 - 12.9 fL    Immature Granulocytes 0.4 0.0 - 0.5 %    Gran # (ANC) 6.9 1.8 - 7.7 K/uL    Immature Grans (Abs) 0.04 0.00 - 0.04 K/uL    Lymph # 1.5 1.0 - 4.8 K/uL    Mono # 0.7 0.3 - 1.0 K/uL    Eos # 0.2 0.0 - 0.5 K/uL    Baso # 0.06 0.00 - 0.20 K/uL    nRBC 0 0 /100 WBC    Gran % 73.8 (H) 38.0 - 73.0 %    Lymph % 16.0 (L) 18.0 - 48.0 %    Mono % 7.4 4.0 - 15.0 %    Eosinophil % 1.8 0.0 - 8.0 %    Basophil % 0.6 0.0 - 1.9 %    Differential Method Automated    Comprehensive metabolic panel   Result Value Ref Range    Sodium 137 136 - 145 mmol/L    Potassium 4.4 3.5 - 5.1 mmol/L    Chloride 101 95 - 110 mmol/L    CO2 25 23 - 29 mmol/L    Glucose 183 (H) 70 - 110 mg/dL    BUN 27 (H) 8 - 23 mg/dL    Creatinine 2.1 (H) 0.5 - 1.4 mg/dL    Calcium 9.6 8.7 -  10.5 mg/dL    Total Protein 7.0 6.0 - 8.4 g/dL    Albumin 3.5 3.5 - 5.2 g/dL    Total Bilirubin 0.7 0.1 - 1.0 mg/dL    Alkaline Phosphatase 93 55 - 135 U/L    AST 27 10 - 40 U/L    ALT 26 10 - 44 U/L    eGFR 33.0 (A) >60 mL/min/1.73 m^2    Anion Gap 11 8 - 16 mmol/L   Brain natriuretic peptide   Result Value Ref Range    BNP 76 0 - 99 pg/mL   Troponin I   Result Value Ref Range    Troponin I 0.010 0.000 - 0.026 ng/mL   Magnesium   Result Value Ref Range    Magnesium 2.1 1.6 - 2.6 mg/dL   Urinalysis, Reflex to Urine Culture Urine, Clean Catch    Specimen: Urine, Clean Catch   Result Value Ref Range    Specimen UA Urine, Clean Catch     Color, UA Yellow Yellow, Straw, Julieta    Appearance, UA Clear Clear    pH, UA 6.0 5.0 - 8.0    Specific Gravity, UA <=1.005 (A) 1.005 - 1.030    Protein, UA Negative Negative    Glucose, UA 1+ (A) Negative    Ketones, UA Negative Negative    Bilirubin (UA) Negative Negative    Occult Blood UA 1+ (A) Negative    Nitrite, UA Negative Negative    Urobilinogen, UA Negative <2.0 EU/dL    Leukocytes, UA Negative Negative   Urinalysis Microscopic   Result Value Ref Range    RBC, UA 1 0 - 4 /hpf    Microscopic Comment SEE COMMENT    EKG 12-lead   Result Value Ref Range    QRS Duration 96 ms    [facility] QTC Calculation 445 ms       EKG Readings: (Independently Interpreted)   Rate of 89 beats per minute.  Normal sinus rhythm.  Normal axis.  P.r., QRS and QTC intervals within normal limits.  No STEMI.     ECG Results              EKG 12-lead (Final result)        Collection Time Result Time QRS Duration [facility] QTC Calculation    07/29/24 13:26:06 07/29/24 17:22:32 96 445                     Final result by Interface, Lab In [facility] (07/29/24 17:22:36)                   Narrative:    Test Reason : R55,    Vent. Rate : 089 BPM     Atrial Rate : 089 BPM     P-R Int : 190 ms          QRS Dur : 096 ms      QT Int : 366 ms       P-R-T Axes : 060 063 071 degrees     QTc Int : 445 ms    Normal sinus  rhythm  Possible Anterior infarct (cited on or before 24-JAN-2018)  Abnormal ECG  When compared with ECG of 29-FEB-2024 18:57,  No significant change was found  Confirmed by MERRILL BECK MD ([id number]) on 7/29/2024 5:22:28 PM    Referred By: BREANNA   SELF           Confirmed By:MERRILL BECK MD                                  Imaging Results              CT Head Without Contrast (Final result)  Result time 07/29/24 14:22:49      Final result by Lei Marie MD (07/29/24 14:22:49)                   Impression:      1.  Negative for acute intracranial process. Negative for hemorrhage, or skull fracture.    2.  Cerebral atrophy noted.  Intracranial atherosclerotic disease noted.  Small vessel ischemic changes noted.  Stable left periventricular and right thalamic lacunar infarcts.    All CT scans at this facility are performed  using dose modulation techniques as appropriate to performed exam including the following:  automated exposure control; adjustment of mA and/or kV according to the patients size (this includes techniques or standardized protocols for targeted exams where dose is matched to indication/reason for exam: i.e. extremities or head);  iterative reconstruction technique.      Electronically signed by: Lei Marie MD  Date:    07/29/2024  Time:    14:22               Narrative:    EXAMINATION:  CT HEAD WITHOUT CONTRAST    CLINICAL HISTORY:  Head trauma, minor (Age >= 65y);    TECHNIQUE:  Axial images through the brain and posterior fossa were obtained without the use of IV contrast.  Sagittal and coronal reconstructions are provided for review.    COMPARISON:  February 29, 2024    FINDINGS:  The ventricles are midline and the CSF spaces are prominent.  The gray-white matter junction is well preserved. Negative for intracranial vascular abnormalities. Negative for mass, mass effect, cerebral edema, hemorrhage or abnormal fluid collections.  Intracranial atherosclerotic disease noted.  Small vessel  ischemic changes noted.  Right thalamic and left periventricular white matter lacunar infarcts again seen.    The skull and scalp are  intact.  Leftward nasal septal deviation.  The   paranasal sinuses, mastoid air cells, middle ears and ear canals are clear. The globes are intact. Postoperative changes to the lens regions.                                       Medications   sodium chloride 0.9% bolus 1,000 mL 1,000 mL (0 mLs Intravenous Stopped 7/29/24 1533)   sodium chloride 0.9% bolus 1,000 mL 1,000 mL (0 mLs Intravenous Stopped 7/29/24 1724)   0.9%  NaCl infusion (1,000 mLs Intravenous New Bag 7/29/24 1813)     Medical Decision Making  DDx includes ICH, Dehydration, Syncope, Arrhythmia, HF, Rhabdo, JOSIAH, CVA    Problems Addressed:  Near syncope: acute illness or injury    Amount and/or Complexity of Data Reviewed  Labs: ordered.  Radiology: ordered.  ECG/medicine tests: ordered and independent interpretation performed. Decision-making details documented in ED Course.    Risk  Prescription drug management.  Decision regarding hospitalization.               ED Course as of 07/29/24 2028 Mon Jul 29, 2024   1504 Positive orthostatics, will hydrate.  [BA]   1756 All historical, clinical, radiographic, and laboratory findings were reviewed with the patient/family in detail along with the indications for transport to the facility in [place] in order to receive hospitalist evaluation.  All remaining questions and concerns were addressed at this time and the patient/family communicates understanding and agrees to proceed accordingly.  Similarly, all pertinent details of the encounter were discussed with Dr. Jackson who agrees to receive the patient at [facility] for further care as outlined above.  The patient will be transferred by [facility] ambulance services secondary to a need for ongoing cardiac monitoring en route.  Jagdish Moffett MD  5:57 PM       [BA]      ED Course User Index  [BA] Jagdish Moffett,  MD          Pt does not want to be admitted to the Hospital. He states he feels better now and understands that he needs to eat and drink more. Pt denies CP, SOB, HA, N/V. Pt is Neurologically intact and exam is non-focal. Repeat Orthostatics performed. Pt remains orthostatic. Pt does not want to be admitted after discussing risks involved. Family at  and agree c plan.   This patient is choosing to leave against medical advice.  Dr. Swann has personally explained to him that choosing to do so may result in permanent bodily harm or death.  The EP discussed at great length that without further evaluation and monitoring there may be unforeseen circumstances and/or deterioration causing permanent bodily harm or death as a result of his choice.  He verbalized these risks back to the physician in laymans terms.  He is alert, oriented, and shows the mental capacity to make clear decisions regarding his health care at this time. He continues to wish to leave against medical advice.     In light of his decision to leave AMA,  he is aware of the importance of following up as instructed.  He has been advised that he should return to the ED immediately if he changes his mind at any time, or if his condition begins to change or worsen in any way.                   Clinical Impression:  Final diagnoses:  [R55] Near syncope  [I95.1] Orthostatic hypotension (Primary)  [R53.1] Asthenia          ED Disposition Condition    AMA Marcelino Cole MD  07/29/24 2028

## 2024-07-29 NOTE — Clinical Note
Diagnosis: Orthostatic hypotension [458.0.ICD-9-CM]   Future Attending Provider: CARMELA WILLINGHAM [[id number]]   Reason for IP Medical Treatment  (Clinical interventions that can only be accomplished in the IP setting? ) :: Orthostatic hypotension   I certify that Inpatient services for greater than or equal to 2 midnights are medically necessary:: No   Plans for Post-Acute care--if anticipated (pick the single best option):: A. No post acute care anticipated at this time   Special Needs:: No Special Needs [1]

## 2024-10-28 ENCOUNTER — LAB VISIT (OUTPATIENT)
Dept: LAB | Facility: HOSPITAL | Age: 69
End: 2024-10-28
Attending: INTERNAL MEDICINE
Payer: MEDICARE

## 2024-10-28 DIAGNOSIS — A41.9 SEPTICEMIA DURING LABOR: Primary | ICD-10-CM

## 2024-10-28 LAB
ALBUMIN SERPL BCP-MCNC: 3.2 G/DL (ref 3.5–5.2)
ALP SERPL-CCNC: 107 U/L (ref 40–150)
ALT SERPL W/O P-5'-P-CCNC: 7 U/L (ref 10–44)
ANION GAP SERPL CALC-SCNC: 10 MMOL/L (ref 8–16)
AST SERPL-CCNC: 48 U/L (ref 10–40)
BASOPHILS # BLD AUTO: 0.06 K/UL (ref 0–0.2)
BASOPHILS NFR BLD: 0.7 % (ref 0–1.9)
BILIRUB SERPL-MCNC: 0.4 MG/DL (ref 0.1–1)
BUN SERPL-MCNC: 17 MG/DL (ref 8–23)
CALCIUM SERPL-MCNC: 8.9 MG/DL (ref 8.7–10.5)
CHLORIDE SERPL-SCNC: 99 MMOL/L (ref 95–110)
CO2 SERPL-SCNC: 29 MMOL/L (ref 23–29)
CREAT SERPL-MCNC: 1.6 MG/DL (ref 0.5–1.4)
DIFFERENTIAL METHOD BLD: ABNORMAL
EOSINOPHIL # BLD AUTO: 0.4 K/UL (ref 0–0.5)
EOSINOPHIL NFR BLD: 4.2 % (ref 0–8)
ERYTHROCYTE [DISTWIDTH] IN BLOOD BY AUTOMATED COUNT: 14.1 % (ref 11.5–14.5)
EST. GFR  (NO RACE VARIABLE): 45.8 ML/MIN/1.73 M^2
GLUCOSE SERPL-MCNC: 218 MG/DL (ref 70–110)
HCT VFR BLD AUTO: 30.1 % (ref 40–54)
HGB BLD-MCNC: 10.2 G/DL (ref 14–18)
IMM GRANULOCYTES # BLD AUTO: 0.02 K/UL (ref 0–0.04)
IMM GRANULOCYTES NFR BLD AUTO: 0.2 % (ref 0–0.5)
LYMPHOCYTES # BLD AUTO: 1.4 K/UL (ref 1–4.8)
LYMPHOCYTES NFR BLD: 16 % (ref 18–48)
MCH RBC QN AUTO: 33 PG (ref 27–31)
MCHC RBC AUTO-ENTMCNC: 33.9 G/DL (ref 32–36)
MCV RBC AUTO: 97 FL (ref 82–98)
MONOCYTES # BLD AUTO: 0.4 K/UL (ref 0.3–1)
MONOCYTES NFR BLD: 4.9 % (ref 4–15)
NEUTROPHILS # BLD AUTO: 6.7 K/UL (ref 1.8–7.7)
NEUTROPHILS NFR BLD: 74 % (ref 38–73)
NRBC BLD-RTO: 0 /100 WBC
PLATELET # BLD AUTO: 246 K/UL (ref 150–450)
PMV BLD AUTO: 11.4 FL (ref 9.2–12.9)
POTASSIUM SERPL-SCNC: 4.9 MMOL/L (ref 3.5–5.1)
PROT SERPL-MCNC: 6.2 G/DL (ref 6–8.4)
RBC # BLD AUTO: 3.09 M/UL (ref 4.6–6.2)
SODIUM SERPL-SCNC: 138 MMOL/L (ref 136–145)
WBC # BLD AUTO: 9.01 K/UL (ref 3.9–12.7)

## 2024-10-28 PROCEDURE — 80053 COMPREHEN METABOLIC PANEL: CPT | Mod: PO | Performed by: INTERNAL MEDICINE

## 2024-10-28 PROCEDURE — 85025 COMPLETE CBC W/AUTO DIFF WBC: CPT | Mod: PO | Performed by: INTERNAL MEDICINE

## 2024-11-10 ENCOUNTER — HOSPITAL ENCOUNTER (EMERGENCY)
Facility: HOSPITAL | Age: 69
Discharge: HOME | End: 2024-11-10
Attending: EMERGENCY MEDICINE
Payer: MEDICARE

## 2024-11-10 VITALS
BODY MASS INDEX: 22.99 KG/M2 | RESPIRATION RATE: 20 BRPM | WEIGHT: 151.69 LBS | TEMPERATURE: 98 F | SYSTOLIC BLOOD PRESSURE: 135 MMHG | HEART RATE: 72 BPM | OXYGEN SATURATION: 100 % | DIASTOLIC BLOOD PRESSURE: 93 MMHG | HEIGHT: 68 IN

## 2024-11-10 DIAGNOSIS — R41.0 CONFUSION: ICD-10-CM

## 2024-11-10 DIAGNOSIS — N28.9 RENAL INSUFFICIENCY: ICD-10-CM

## 2024-11-10 DIAGNOSIS — R29.6 FREQUENT FALLS: Primary | ICD-10-CM

## 2024-11-10 LAB
ALBUMIN SERPL BCP-MCNC: 3.2 G/DL (ref 3.5–5.2)
ALP SERPL-CCNC: 139 U/L (ref 40–150)
ALT SERPL W/O P-5'-P-CCNC: 13 U/L (ref 10–44)
ANION GAP SERPL CALC-SCNC: 12 MMOL/L (ref 8–16)
AST SERPL-CCNC: 24 U/L (ref 10–40)
BACTERIA #/AREA URNS AUTO: ABNORMAL /HPF
BASOPHILS # BLD AUTO: 0.04 K/UL (ref 0–0.2)
BASOPHILS NFR BLD: 1 % (ref 0–1.9)
BILIRUB SERPL-MCNC: 0.4 MG/DL (ref 0.1–1)
BILIRUB UR QL STRIP: NEGATIVE
BUN SERPL-MCNC: 23 MG/DL (ref 8–23)
CALCIUM SERPL-MCNC: 9.1 MG/DL (ref 8.7–10.5)
CHLORIDE SERPL-SCNC: 100 MMOL/L (ref 95–110)
CLARITY UR REFRACT.AUTO: CLEAR
CO2 SERPL-SCNC: 27 MMOL/L (ref 23–29)
COLOR UR AUTO: YELLOW
CREAT SERPL-MCNC: 2 MG/DL (ref 0.5–1.4)
DIFFERENTIAL METHOD BLD: ABNORMAL
EOSINOPHIL # BLD AUTO: 0.2 K/UL (ref 0–0.5)
EOSINOPHIL NFR BLD: 4.6 % (ref 0–8)
ERYTHROCYTE [DISTWIDTH] IN BLOOD BY AUTOMATED COUNT: 14.2 % (ref 11.5–14.5)
EST. GFR  (NO RACE VARIABLE): 35 ML/MIN/1.73 M^2
GLUCOSE SERPL-MCNC: 186 MG/DL (ref 70–110)
GLUCOSE UR QL STRIP: ABNORMAL
HCT VFR BLD AUTO: 47.9 % (ref 40–54)
HEP C VIRUS HOLD SPECIMEN: NORMAL
HGB BLD-MCNC: 15.4 G/DL (ref 14–18)
HGB UR QL STRIP: NEGATIVE
IMM GRANULOCYTES # BLD AUTO: 0.01 K/UL (ref 0–0.04)
IMM GRANULOCYTES NFR BLD AUTO: 0.3 % (ref 0–0.5)
KETONES UR QL STRIP: NEGATIVE
LACTATE SERPL-SCNC: 1.7 MMOL/L (ref 0.5–2.2)
LEUKOCYTE ESTERASE UR QL STRIP: ABNORMAL
LYMPHOCYTES # BLD AUTO: 0.7 K/UL (ref 1–4.8)
LYMPHOCYTES NFR BLD: 17.2 % (ref 18–48)
MCH RBC QN AUTO: 32 PG (ref 27–31)
MCHC RBC AUTO-ENTMCNC: 32.2 G/DL (ref 32–36)
MCV RBC AUTO: 99 FL (ref 82–98)
MICROSCOPIC COMMENT: ABNORMAL
MONOCYTES # BLD AUTO: 0.4 K/UL (ref 0.3–1)
MONOCYTES NFR BLD: 10.1 % (ref 4–15)
NEUTROPHILS # BLD AUTO: 2.6 K/UL (ref 1.8–7.7)
NEUTROPHILS NFR BLD: 66.8 % (ref 38–73)
NITRITE UR QL STRIP: NEGATIVE
NRBC BLD-RTO: 0 /100 WBC
OHS QRS DURATION: 92 MS
OHS QTC CALCULATION: 429 MS
PH UR STRIP: 6 [PH] (ref 5–8)
PLATELET # BLD AUTO: 128 K/UL (ref 150–450)
PMV BLD AUTO: 11.6 FL (ref 9.2–12.9)
POTASSIUM SERPL-SCNC: 4.8 MMOL/L (ref 3.5–5.1)
PROCALCITONIN SERPL IA-MCNC: 0.15 NG/ML
PROT SERPL-MCNC: 7.3 G/DL (ref 6–8.4)
PROT UR QL STRIP: ABNORMAL
RBC # BLD AUTO: 4.82 M/UL (ref 4.6–6.2)
RBC #/AREA URNS AUTO: 2 /HPF (ref 0–4)
SODIUM SERPL-SCNC: 139 MMOL/L (ref 136–145)
SP GR UR STRIP: 1.02 (ref 1–1.03)
URN SPEC COLLECT METH UR: ABNORMAL
UROBILINOGEN UR STRIP-ACNC: <2 EU/DL
WBC # BLD AUTO: 3.95 K/UL (ref 3.9–12.7)
WBC #/AREA URNS AUTO: 8 /HPF (ref 0–5)
YEAST UR QL AUTO: ABNORMAL

## 2024-11-10 PROCEDURE — 87040 BLOOD CULTURE FOR BACTERIA: CPT | Performed by: EMERGENCY MEDICINE

## 2024-11-10 PROCEDURE — 83605 ASSAY OF LACTIC ACID: CPT | Mod: ER | Performed by: EMERGENCY MEDICINE

## 2024-11-10 PROCEDURE — 80171 DRUG SCREEN QUANT GABAPENTIN: CPT | Performed by: EMERGENCY MEDICINE

## 2024-11-10 PROCEDURE — 87389 HIV-1 AG W/HIV-1&-2 AB AG IA: CPT | Performed by: EMERGENCY MEDICINE

## 2024-11-10 PROCEDURE — 96361 HYDRATE IV INFUSION ADD-ON: CPT | Mod: ER

## 2024-11-10 PROCEDURE — 80053 COMPREHEN METABOLIC PANEL: CPT | Mod: ER | Performed by: EMERGENCY MEDICINE

## 2024-11-10 PROCEDURE — 81000 URINALYSIS NONAUTO W/SCOPE: CPT | Mod: ER | Performed by: EMERGENCY MEDICINE

## 2024-11-10 PROCEDURE — 99285 EMERGENCY DEPT VISIT HI MDM: CPT | Mod: 25,ER

## 2024-11-10 PROCEDURE — 93010 ELECTROCARDIOGRAM REPORT: CPT | Mod: ,,, | Performed by: INTERNAL MEDICINE

## 2024-11-10 PROCEDURE — 86803 HEPATITIS C AB TEST: CPT | Performed by: EMERGENCY MEDICINE

## 2024-11-10 PROCEDURE — 85025 COMPLETE CBC W/AUTO DIFF WBC: CPT | Mod: ER | Performed by: EMERGENCY MEDICINE

## 2024-11-10 PROCEDURE — 63600175 PHARM REV CODE 636 W HCPCS: Mod: ER | Performed by: EMERGENCY MEDICINE

## 2024-11-10 PROCEDURE — 93005 ELECTROCARDIOGRAM TRACING: CPT | Mod: ER

## 2024-11-10 PROCEDURE — 25000003 PHARM REV CODE 250: Mod: ER | Performed by: EMERGENCY MEDICINE

## 2024-11-10 PROCEDURE — 84145 PROCALCITONIN (PCT): CPT | Mod: ER | Performed by: EMERGENCY MEDICINE

## 2024-11-10 PROCEDURE — 96374 THER/PROPH/DIAG INJ IV PUSH: CPT | Mod: ER

## 2024-11-10 RX ORDER — HYDRALAZINE HYDROCHLORIDE 20 MG/ML
10 INJECTION INTRAMUSCULAR; INTRAVENOUS
Status: COMPLETED | OUTPATIENT
Start: 2024-11-10 | End: 2024-11-10

## 2024-11-10 RX ORDER — METOPROLOL TARTRATE 1 MG/ML
5 INJECTION, SOLUTION INTRAVENOUS EVERY 5 MIN PRN
Status: DISCONTINUED | OUTPATIENT
Start: 2024-11-10 | End: 2024-11-10

## 2024-11-10 RX ADMIN — SODIUM CHLORIDE 500 ML: 9 INJECTION, SOLUTION INTRAVENOUS at 01:11

## 2024-11-10 RX ADMIN — HYDRALAZINE HYDROCHLORIDE 10 MG: 20 INJECTION, SOLUTION INTRAMUSCULAR; INTRAVENOUS at 01:11

## 2024-11-10 RX ADMIN — SODIUM CHLORIDE 500 ML: 9 INJECTION, SOLUTION INTRAVENOUS at 02:11

## 2024-11-10 NOTE — ED PROVIDER NOTES
"Emergency Medicine Provider Note - 11/10/2024       History     Chief Complaint   Patient presents with    Altered Mental Status     Finished antibiotic 10/31 for sepsis. Lives at assisted living. Going into patient rooms and doing things out of the norm. Daughter reports patient fell at 2 am this morning.       Allergies:  Review of patient's allergies indicates:  No Known Allergies     History of Present Illness   HPI    11/10/2024, 1:13 PM  The history is provided by the  Daughter in Law (Diane) Daughter (Ayanna Osorio) and patient    Primary Historian is Daughter, Ayanna Osorio.    Sailaja Ordaz is a 69 y.o. male presenting to the ED for confusion and frequent falls.  Patient has history of diabetes, tremor, hyperlipidemia, peripheral vascular disease,  urosepsis 10/2024.  Patient presents to emergency department with worsening confusion for the past 3 days.  Family states that patient will not follow directions.   Patient had recently been moved into assisted living this week.  Patient reportedly has been getting up in the middle of the night walking into other residents rooms.  Patient had been at the door  "believing that his brother was coming to pick him up."   Patient is " seeing his mother. "Patient normally ambulates with the assistance of the walker since his discharge from our [facility] in October of 2024.  Recently, patient was forgetting to use the walker.  Patient has sustained several falls.  Patient is on gabapentin.  He takes 300 mg twice a day.  They are concerned that the gabapentin may be affecting him.  him.  They had given him a half dose yesterday.  He did receive a full dose today.  Patient recently hospitalized at our [facility] with urosepsis.  Patient had completed IV antibiotic dose on Halloween .  There has been no reported fever, cough, vomiting, diarrhea, or urinary complaints. Family is concerned about worsening creatinine.  There has been no " reported fever, cough, vomiting, or urinary complaints.      Arrival mode: Private Vehicle     PCP: Clark Lu MD     Past Medical History:  Past Medical History:   Diagnosis Date    Cancer     Depression     Diabetes mellitus     Hyperlipidemia     Hypertension     Neuropathy     Psoriasis     Thyroid disease        Past Surgical History:  Past Surgical History:   Procedure Laterality Date    PROSTATECTOMY           Family History:  No family history on file.    Social History:  Social History     Tobacco Use    Smoking status: Never    Smokeless tobacco: Never   Substance and Sexual Activity    Alcohol use: No    Drug use: No    Sexual activity: Never       The Past Medical History, Social History, Surgical History and Family History was reviewed as documented above.     Review of Systems   Review of Systems   Constitutional:  Negative for fever.   Respiratory:  Negative for cough.    Cardiovascular:  Negative for chest pain.   Gastrointestinal:  Negative for nausea and vomiting.   Genitourinary:  Negative for dysuria.   Musculoskeletal:  Negative for back pain.   Skin:  Negative for rash.   Neurological:  Negative for weakness.   Hematological:  Does not bruise/bleed easily.   Psychiatric/Behavioral:  Positive for confusion.         Physical Exam     Initial Vitals [11/10/24 1029]   BP Pulse Resp Temp SpO2   133/60 99 20 97.7 °F (36.5 °C) 98 %      MAP       --          Physical Exam    Nursing Notes and Vital Signs Reviewed.  Constitutional: Patient is in no apparent distress. Well-developed and well-nourished.  Head: Atraumatic. Normocephalic.  Eyes: PERRL. EOM intact. Conjunctivae are not pale. No scleral icterus.  ENT: Mucous membranes are moist. Oropharynx is clear and symmetric.    Neck: Supple. Full ROM. No lymphadenopathy.  No midline cervical neck tenderness.  Cardiovascular: Regular rate. Regular rhythm. No murmurs, rubs, or gallops. Distal pulses are 2+ and symmetric.  Pulmonary/Chest: No  "respiratory distress. Clear to auscultation bilaterally. No wheezing or rales.  Abdominal: Soft and non-distended.  There is no tenderness.  No rebound, guarding, or rigidity. Good bowel sounds.  Musculoskeletal: Moves all extremities. No obvious deformities. No edema. No calf tenderness.  Genitourinary: N/A  Skin: Warm and dry.  Neurological:  Alert, awake, and appropriate.  Normal speech.  No acute focal neurological deficits are appreciated.  Patient was alert and oriented x3.  Oriented to location, year, month, day a week.  Not oriented to president.  Psychiatric: Normal affect. Good eye contact. Appropriate in content.    Left foot:  Cap refill less than 3 seconds.  There is slight redness to the  second toe..  Patient able to wiggle toes.     ED Course   ED Procedures:  Procedures    ED Vital Signs:  Vitals:    11/10/24 1027 11/10/24 1029 11/10/24 1052 11/10/24 1119   BP:  133/60  (!) 177/61   Pulse:  99 97 70   Resp:  20  18   Temp:  97.7 °F (36.5 °C)     TempSrc:  Oral     SpO2:  98%  100%   Weight: 68.6 kg (151 lb 3.8 oz) 68.8 kg (151 lb 10.8 oz)     Height: 5' 8" (1.727 m) 5' 8" (1.727 m)      11/10/24 1327 11/10/24 1334 11/10/24 1349 11/10/24 1454   BP: (!) 194/68 (!) 196/81 (!) 162/71 (!) 135/93   Pulse:  70 73 72   Resp:  16 18 20   Temp:       TempSrc:       SpO2:  98% 100% 100%   Weight:       Height:           All Lab Results:  Results for orders placed or performed during the hospital encounter of 11/10/24   Urinalysis, Reflex to Urine Culture Urine, Clean Catch    Collection Time: 11/10/24 10:54 AM    Specimen: Urine   Result Value Ref Range    Specimen UA Urine, Clean Catch     Color, UA Yellow Yellow, Straw, Julieta    Appearance, UA Clear Clear    pH, UA 6.0 5.0 - 8.0    Specific Gravity, UA 1.025 1.005 - 1.030    Protein, UA Trace (A) Negative    Glucose, UA 3+ (A) Negative    Ketones, UA Negative Negative    Bilirubin (UA) Negative Negative    Occult Blood UA Negative Negative    Nitrite, UA " Negative Negative    Urobilinogen, UA <2.0 <2.0 EU/dL    Leukocytes, UA Trace (A) Negative   Urinalysis Microscopic    Collection Time: 11/10/24 10:54 AM   Result Value Ref Range    RBC, UA 2 0 - 4 /hpf    WBC, UA 8 (H) 0 - 5 /hpf    Bacteria Occasional None-Occ /hpf    Yeast, UA Rare (A) None    Microscopic Comment SEE COMMENT    CBC auto differential    Collection Time: 11/10/24 12:01 PM   Result Value Ref Range    WBC 3.95 3.90 - 12.70 K/uL    RBC 4.82 4.60 - 6.20 M/uL    Hemoglobin 15.4 14.0 - 18.0 g/dL    Hematocrit 47.9 40.0 - 54.0 %    MCV 99 (H) 82 - 98 fL    MCH 32.0 (H) 27.0 - 31.0 pg    MCHC 32.2 32.0 - 36.0 g/dL    RDW 14.2 11.5 - 14.5 %    Platelets 128 (L) 150 - 450 K/uL    MPV 11.6 9.2 - 12.9 fL    Immature Granulocytes 0.3 0.0 - 0.5 %    Gran # (ANC) 2.6 1.8 - 7.7 K/uL    Immature Grans (Abs) 0.01 0.00 - 0.04 K/uL    Lymph # 0.7 (L) 1.0 - 4.8 K/uL    Mono # 0.4 0.3 - 1.0 K/uL    Eos # 0.2 0.0 - 0.5 K/uL    Baso # 0.04 0.00 - 0.20 K/uL    nRBC 0 0 /100 WBC    Gran % 66.8 38.0 - 73.0 %    Lymph % 17.2 (L) 18.0 - 48.0 %    Mono % 10.1 4.0 - 15.0 %    Eosinophil % 4.6 0.0 - 8.0 %    Basophil % 1.0 0.0 - 1.9 %    Differential Method Automated    Comprehensive metabolic panel    Collection Time: 11/10/24 12:01 PM   Result Value Ref Range    Sodium 139 136 - 145 mmol/L    Potassium 4.8 3.5 - 5.1 mmol/L    Chloride 100 95 - 110 mmol/L    CO2 27 23 - 29 mmol/L    Glucose 186 (H) 70 - 110 mg/dL    BUN 23 8 - 23 mg/dL    Creatinine 2.0 (H) 0.5 - 1.4 mg/dL    Calcium 9.1 8.7 - 10.5 mg/dL    Total Protein 7.3 6.0 - 8.4 g/dL    Albumin 3.2 (L) 3.5 - 5.2 g/dL    Total Bilirubin 0.4 0.1 - 1.0 mg/dL    Alkaline Phosphatase 139 40 - 150 U/L    AST 24 10 - 40 U/L    ALT 13 10 - 44 U/L    eGFR 35.0 (A) >60 mL/min/1.73 m^2    Anion Gap 12 8 - 16 mmol/L   Lactic acid, plasma #1    Collection Time: 11/10/24 12:01 PM   Result Value Ref Range    Lactate (Lactic Acid) 1.7 0.5 - 2.2 mmol/L   Procalcitonin    Collection Time:  11/10/24 12:01 PM   Result Value Ref Range    Procalcitonin 0.15 <0.25 ng/mL         Reviewed Prior Labs:   Latest Reference Range & Units 10/15/24 23:11 10/16/24 04:33 10/18/24 04:10 10/19/24 04:29 10/20/24 03:51 10/21/24 05:47 10/28/24 18:10 11/10/24 12:01   Anion Gap 8 - 16 mmol/L 11 (E) 11 (E) 11 (E) 10 (E) 12 (E) 9 (E) 10 12   BUN 8 - 23 mg/dL 35 (H) (E) 34 (H) (E) 21 (E) 17 (E) 18 (E) 18 (E) 17 23   Creatinine 0.5 - 1.4 mg/dL 2.80 (H) (E) 2.68 (H) (E) 1.97 (H) (E) 1.69 (H) (E) 1.59 (H) (E) 1.58 (H) (E) 1.6 (H) 2.0 (H)       The EKG was ordered, reviewed, and independently interpreted by the ED provider:      ECG Results              EKG 12-lead (Preliminary result)  Result time 11/10/24 11:48:27      Wet Read by Leanne Quiros DO (11/10/24 11:48:27, [facility], Emergency Medicine)    Rate of 70 beats per minute.  Normal sinus rhythm.  Normal axis.  No ST segment elevation.  No STEMI                                    Imaging Results:  Imaging Results              CT Cervical Spine Without Contrast (Final result)  Result time 11/10/24 13:08:21   Procedure changed from CT Cervical Spine With Contrast     Final result by Ilia Nicolas MD (11/10/24 13:08:21)                   Impression:      No fracture or traumatic malalignment of the cervical spine.    Advanced degenerative change as above.  Correlation with symptoms.    All CT scans at this facility are performed  using dose modulation techniques as appropriate to performed exam including the following:  automated exposure control; adjustment of mA and/or kV according to the patients size (this includes techniques or standardized protocols for targeted exams where dose is matched to indication/reason for exam: i.e. extremities or head);  iterative reconstruction technique.      Electronically signed by: Ilia Nicolas  Date:    11/10/2024  Time:    13:08               Narrative:    EXAMINATION:  CT CERVICAL SPINE WITHOUT  CONTRAST    CLINICAL HISTORY:  Neck trauma (Age >= 65y);    TECHNIQUE:  Low dose axial CT images through the cervical spine, with sagittal and coronal reformations.  Contrast was not administered.    FINDINGS:  The vertebral bodies are normal in height and morphology without evidence of fracture or osseous destructive process.  Normal sagittal alignment is preserved.    Degenerative changes with moderate to severe spinal canal stenosis C3-4 and moderate spinal canal stenosis C4-5 and C5-6 and C6-7.  Scattered mild and moderate neural foraminal narrowing throughout.    Limited evaluation of the intraspinal contents demonstrates no hematoma or mass.Paraspinal soft tissues exhibit no acute abnormalities.                                       CT Head Without Contrast (Final result)  Result time 11/10/24 12:40:40      Final result by Ilia Nicolas MD (11/10/24 12:40:40)                   Impression:      No acute intracranial CT abnormality.    All CT scans at this facility are performed  using dose modulation techniques as appropriate to performed exam including the following:  automated exposure control; adjustment of mA and/or kV according to the patients size (this includes techniques or standardized protocols for targeted exams where dose is matched to indication/reason for exam: i.e. extremities or head);  iterative reconstruction technique.      Electronically signed by: Ilia Nicolas  Date:    11/10/2024  Time:    12:40               Narrative:    EXAMINATION:  CT HEAD WITHOUT CONTRAST    CLINICAL HISTORY:  Memory loss;Confusion; Disorientation, unspecified    TECHNIQUE:  Low dose axial CT images obtained throughout the head without intravenous contrast. Sagittal and coronal reconstructions were performed.    COMPARISON:  None.    FINDINGS:  Intracranial compartment:    Ventricles and sulci are normal in size for age without evidence of hydrocephalus. No extra-axial blood or fluid collections.    Moderate  microvascular ischemic change.  No parenchymal mass, hemorrhage, edema or major vascular distribution infarct.    Skull/extracranial contents (limited evaluation): No fracture. Mastoid air cells and paranasal sinuses are essentially clear.                                       X-Ray Chest AP Portable (Final result)  Result time 11/10/24 11:42:57      Final result by Ilia Nicolas MD (11/10/24 11:42:57)                   Impression:      No acute abnormality.      Electronically signed by: Ilia Nicolas  Date:    11/10/2024  Time:    11:42               Narrative:    EXAMINATION:  XR CHEST AP PORTABLE    CLINICAL HISTORY:  Sepsis;    TECHNIQUE:  Single frontal view of the chest was performed.    COMPARISON:  Multiple    FINDINGS:  The lungs are clear, with normal appearance of pulmonary vasculature and no pleural effusion or pneumothorax.    The cardiac silhouette is normal in size. The hilar and mediastinal contours are unremarkable.    Bones are intact.                                            The Emergency Provider reviewed the vital signs and test results, which are outlined above.     ED Discussion   ED Medication(s):  Medications   sodium chloride 0.9% bolus 500 mL 500 mL (0 mLs Intravenous Stopped 11/10/24 1410)   hydrALAZINE injection 10 mg (10 mg Intravenous Given 11/10/24 1327)   sodium chloride 0.9% bolus 500 mL 500 mL (0 mLs Intravenous Stopped 11/10/24 1453)       ED Course as of 11/10/24 1822   Sun Nov 10, 2024   1359 Updated family.  They would like to be d/c to home.   Return precautions given.  [LB]      ED Course User Index  [LB] Leanne Quiros,                     14:04 Reassessment: Dr. Quiros reassessed the pt.  The pt is resting comfortably and is NAD.  Pt states their sx have improved. Discussed test results, shared treatment plan, specific conditions for return, and the need for f/u.  Answered their questions at this time.  Pt understands and agrees to the plan.  The pt has  remained hemodynamically stable through ED course and is stable for discharge.    I discussed with patient and/or family/caretaker that evaluation in the ED does not suggest any emergent or life threatening medical conditions requiring immediate intervention beyond what was provided in the ED, and I believe patient is safe for discharge.  Regardless, an unremarkable evaluation in the ED does not preclude the development or presence of a serious of life threatening condition. As such, patient was instructed to return immediately for any worsening or change in current symptoms.     MIPS Measures     Smoker? No     Hypertension: Blood pressure was > 120/80.  Patient was informed that they may be developing hypertension.  They were advised to keep a log of their blood pressure and follow up with their primary care physician.      MIPS Measure #415:  Emergency Department Utilization of CT for Minor Blunt Head Trauma for Patients age 18 Years  and  Older.    Measure exclusions:  The patient has a ventricular shunt? No  The patient has a brain tumor? No  The patient has multi-system trauma? No  The patient is pregnant? No  The patient is taking anti platelet medication excluding aspirin?  Yes  Age 65 years and older?  Patient is 69 y.o.    A head CT was ordered? Yes:   The CT was ordered by the emergency provider?  Yes           Medical Decision Making                 Medical Decision Making  Differential diagnosis:  Intracranial hemorrhage, subdural hematoma, epidural hematoma, urinary tract infection, pneumonia, electrolyte abnormality, acute kidney injury.    ED course: Patient was awake alert oriented in the emergency room.  No focal deficits.  No signs of service based on vital signs.  WBC 3.95.  H/H 15.4/40.9.  Elevated MCH/MCV.  Platelet count 128.  Blood glucose 186.  BUN 23.  Creatinine 2.0.  Baseline creatinine between 1.59-1.7.  Urinalysis trace leukocyte esterase.  Eight WBCs.  Occasional bacteria.  Negative  nitrites.  3+ sugar.  CT cervical spine no fracture or traumatic malalignment of the cervical spine.  CT head:  Moderate microvascular ischemic changes.  No parenchymal mass, hemorrhage, edema, or major vascular infarct.  Chest x-ray clear.  Patient given 1 L fluid as patient has acute on chronic renal insufficiency.  Patient's family members plan to decrease/wean patient off of gabapentin.  They are requesting gabapentin level.  Discussed with family and patient about possibility of observation versus outpatient workup.  They have elected outpatient workup.  Outpatient referral for Neurology.    Amount and/or Complexity of Data Reviewed  Independent Historian: spouse     Details: See HPI.  External Data Reviewed: labs.  Labs: ordered. Decision-making details documented in ED Course.  Radiology: ordered. Decision-making details documented in ED Course.  ECG/medicine tests: ordered and independent interpretation performed. Decision-making details documented in ED Course.    Risk  Prescription drug management.        Coding    Referrals:  Orders Placed This Encounter   Procedures    Ambulatory referral/consult to Neurology     Standing Status:   Future     Standing Expiration Date:   12/10/2025     Referral Priority:   Routine     Referral Type:   Consultation     Referral Reason:   Specialty Services Required     Requested Specialty:   Neurology     Number of Visits Requested:   1       Prescription Management: I performed a review of the patient's current Rx medication list as input by nursing staff.    Discharge Medication List as of 11/10/2024  2:04 PM        CONTINUE these medications which have NOT CHANGED    Details   acitretin (SORIATANE) 10 MG capsule Take 10 mg by mouth., Starting Mon 12/12/2022, Historical Med      aspirin (ECOTRIN) 81 MG EC tablet Take 81 mg by mouth., Historical Med      atorvastatin (LIPITOR) 40 MG tablet Take 40 mg by mouth., Starting Mon 12/12/2022, Historical Med     "  calcipotriene-betamethasone (TACLONEX) external suspension Apply topically once daily., Historical Med      calcium-vitamin D3 (OS- + D3) 500 mg-5 mcg (200 unit) per tablet Take 1 tablet by mouth 2 (two) times daily with meals., Historical Med      cilostazoL (PLETAL) 50 MG Tab Take 50 mg by mouth 2 (two) times daily., Starting 2023, Historical Med      cinnamon bark (CINNAMON) 500 mg capsule Take 1,000 mg by mouth once daily., Historical Med      clobetasol (TEMOVATE) 0.05 % external solution Apply topically 2 (two) times daily., Historical Med      diltiaZEM (CARDIZEM CD) 180 MG 24 hr capsule Take 180 mg by mouth 2 (two) times daily., Historical Med      DROPLET PEN NEEDLE 32 gauge x 5/32" Ndle Starting 2023, Historical Med      etanercept (ENBREL) 25 mg/0.5mL (0.51) Syrg injection Inject 25 mg into the skin once a week., Historical Med      fish oil-omega-3 fatty acids 300-1,000 mg capsule Take 1 capsule by mouth once daily., Historical Med      fluorouraciL (EFUDEX) 5 % cream Apply topically every morning., Starting Thu 12/15/2022, Historical Med      FOLIC ACID/MULTIVIT-MIN/LUTEIN (CENTRUM SILVER ORAL) Take by mouth Daily., Historical Med      gabapentin (NEURONTIN) 300 MG capsule Take 300 mg by mouth 2 (two) times daily., Historical Med      glimepiride (AMARYL) 4 MG tablet Take 4 mg by mouth before breakfast., Historical Med      hydrocortisone 2.5 % cream SMARTSI Topical Daily, Historical Med      insulin aspart protamine-insulin aspart (NOVOLOG 70/30) 100 unit/mL (70-30) InPn pen Inject into the skin 2 (two) times daily before meals., Historical Med      LANTUS SOLOSTAR U-100 INSULIN glargine 100 units/mL SubQ pen Inject into the skin., Starting 2023, Historical Med      levothyroxine (SYNTHROID) 75 MCG tablet Take 1 tablet (75 mcg total) by mouth before breakfast., Starting Sun 2018, Until 2019, Print      losartan (COZAAR) 50 MG tablet Take 50 mg by mouth " "once daily., Historical Med      metFORMIN (GLUCOPHAGE) 1000 MG tablet Take 1,000 mg by mouth 2 (two) times daily with meals., Historical Med      metoprolol tartrate (LOPRESSOR) 25 MG tablet Take 25 mg by mouth 2 (two) times daily., Starting 2023, Historical Med      mupirocin (BACTROBAN) 2 % ointment Apply topically every evening., Starting Thu 12/15/2022, Historical Med      MYRBETRIQ 50 mg Tb24 Take 1 tablet by mouth., Starting 2022, Historical Med      OZEMPIC 0.25 mg or 0.5 mg(2 mg/1.5 mL) pen injector Inject into the skin., Starting Thu 12/15/2022, Historical Med      promethazine-dextromethorphan (PROMETHAZINE-DM) 6.25-15 mg/5 mL Syrp Take 5 mLs by mouth every 6 (six) hours as needed., Starting 2018, Historical Med      rivaroxaban (XARELTO) 2.5 mg Tab Take by mouth 2 (two) times daily with meals., Historical Med      solifenacin (VESICARE) 5 MG tablet Take 5 mg by mouth once daily., Historical Med      triamcinolone acetonide 0.1% (KENALOG) 0.1 % cream SMARTSI Application Topical 2-3 Times Daily, Historical Med      venlafaxine (EFFEXOR-XR) 75 MG 24 hr capsule Take 75 mg by mouth once daily., Historical Med              Discussed case verbally with: N/A      Portions of this note may have been created with voice recognition software. Occasional "wrong-word" or "sound-a-like" substitutions may have occurred due to the inherent limitations of voice recognition software. Please, read the note carefully and recognize, using context, where substitutions have occurred.          Clinical Impression       ICD-10-CM ICD-9-CM   1. Frequent falls  R29.6 V15.88   2. Confusion  R41.0 298.9   3. Renal insufficiency  N28.9 593.9        Disposition        Disposition: Discharge to home  Patient condition: Stable    ED Follow-up      Follow-up Information       Clark Lu MD.    Specialty: Internal Medicine  Why: Return to emergency department for:  Fever, worsening confusion, numbness " or weakness to 1 side, slurred speech, discoloration of the toes, concerns of self-harm, or worsening medical condition  Contact information:  [address] [facility] [address] [phone]               [facility] In 2 days.    Specialty: Neurology  Contact information:  [address] [facility] [address] [phone]  Additional information:  Please park on the [address] side and use the Clinic entrance. Check in on the 1st floor, to the right across from the café.                                        Leanne Quiros, DO  11/10/24 1823

## 2024-11-11 LAB
HCV AB SERPL QL IA: NEGATIVE
HIV 1+2 AB+HIV1 P24 AG SERPL QL IA: NEGATIVE

## 2024-11-13 LAB — GABAPENTIN SERPLBLD-MCNC: 6.7 MCG/ML (ref 2–20)

## 2024-11-16 LAB
BACTERIA BLD CULT: NORMAL
BACTERIA BLD CULT: NORMAL